# Patient Record
Sex: MALE | Race: BLACK OR AFRICAN AMERICAN | NOT HISPANIC OR LATINO | Employment: STUDENT | URBAN - NONMETROPOLITAN AREA
[De-identification: names, ages, dates, MRNs, and addresses within clinical notes are randomized per-mention and may not be internally consistent; named-entity substitution may affect disease eponyms.]

---

## 2024-01-04 ENCOUNTER — HOSPITAL ENCOUNTER (OUTPATIENT)
Facility: HOSPITAL | Age: 19
Setting detail: OBSERVATION
Discharge: HOME | End: 2024-01-07
Attending: STUDENT IN AN ORGANIZED HEALTH CARE EDUCATION/TRAINING PROGRAM | Admitting: INTERNAL MEDICINE
Payer: COMMERCIAL

## 2024-01-04 ENCOUNTER — APPOINTMENT (OUTPATIENT)
Dept: ULTRASOUND IMAGING | Facility: HOSPITAL | Age: 19
End: 2024-01-04
Payer: COMMERCIAL

## 2024-01-04 ENCOUNTER — APPOINTMENT (OUTPATIENT)
Dept: CT IMAGING | Facility: HOSPITAL | Age: 19
End: 2024-01-04
Payer: COMMERCIAL

## 2024-01-04 ENCOUNTER — APPOINTMENT (OUTPATIENT)
Dept: GENERAL RADIOLOGY | Facility: HOSPITAL | Age: 19
End: 2024-01-04
Payer: COMMERCIAL

## 2024-01-04 DIAGNOSIS — B54 MALARIA: ICD-10-CM

## 2024-01-04 DIAGNOSIS — R65.10 SIRS (SYSTEMIC INFLAMMATORY RESPONSE SYNDROME): Primary | ICD-10-CM

## 2024-01-04 LAB
ALBUMIN SERPL-MCNC: 3.5 G/DL (ref 3.5–5.2)
ALBUMIN/GLOB SERPL: 0.9 G/DL
ALP SERPL-CCNC: 103 U/L (ref 39–117)
ALT SERPL W P-5'-P-CCNC: 25 U/L (ref 1–41)
ANION GAP SERPL CALCULATED.3IONS-SCNC: 7.3 MMOL/L (ref 5–15)
APTT PPP: 32.2 SECONDS (ref 26.5–34.5)
AST SERPL-CCNC: 23 U/L (ref 1–40)
B PARAPERT DNA SPEC QL NAA+PROBE: NOT DETECTED
B PERT DNA SPEC QL NAA+PROBE: NOT DETECTED
BILIRUB CONJ SERPL-MCNC: 0.3 MG/DL (ref 0–0.3)
BILIRUB SERPL-MCNC: 1.8 MG/DL (ref 0–1.2)
BILIRUB UR QL STRIP: NEGATIVE
BUN SERPL-MCNC: 16 MG/DL (ref 6–20)
BUN/CREAT SERPL: 11.8 (ref 7–25)
C PNEUM DNA NPH QL NAA+NON-PROBE: NOT DETECTED
CALCIUM SPEC-SCNC: 9.1 MG/DL (ref 8.6–10.5)
CHLORIDE SERPL-SCNC: 97 MMOL/L (ref 98–107)
CK SERPL-CCNC: 112 U/L (ref 20–200)
CLARITY UR: CLEAR
CO2 SERPL-SCNC: 24.7 MMOL/L (ref 22–29)
COLOR UR: YELLOW
CREAT SERPL-MCNC: 1.36 MG/DL (ref 0.76–1.27)
D-LACTATE SERPL-SCNC: 1.1 MMOL/L (ref 0.5–2)
EGFRCR SERPLBLD CKD-EPI 2021: 76.4 ML/MIN/1.73
FLUAV SUBTYP SPEC NAA+PROBE: NOT DETECTED
FLUBV RNA ISLT QL NAA+PROBE: NOT DETECTED
GLOBULIN UR ELPH-MCNC: 4.1 GM/DL
GLUCOSE SERPL-MCNC: 118 MG/DL (ref 65–99)
GLUCOSE UR STRIP-MCNC: NEGATIVE MG/DL
HADV DNA SPEC NAA+PROBE: NOT DETECTED
HAV IGM SERPL QL IA: NORMAL
HBV CORE IGM SERPL QL IA: NORMAL
HBV SURFACE AG SERPL QL IA: NORMAL
HCOV 229E RNA SPEC QL NAA+PROBE: NOT DETECTED
HCOV HKU1 RNA SPEC QL NAA+PROBE: NOT DETECTED
HCOV NL63 RNA SPEC QL NAA+PROBE: NOT DETECTED
HCOV OC43 RNA SPEC QL NAA+PROBE: NOT DETECTED
HCV AB SER DONR QL: NORMAL
HETEROPH AB SER QL LA: NEGATIVE
HGB UR QL STRIP.AUTO: NEGATIVE
HMPV RNA NPH QL NAA+NON-PROBE: NOT DETECTED
HOLD SPECIMEN: NORMAL
HOLD SPECIMEN: NORMAL
HPIV1 RNA ISLT QL NAA+PROBE: NOT DETECTED
HPIV2 RNA SPEC QL NAA+PROBE: NOT DETECTED
HPIV3 RNA NPH QL NAA+PROBE: NOT DETECTED
HPIV4 P GENE NPH QL NAA+PROBE: NOT DETECTED
INR PPP: 1.13 (ref 0.9–1.1)
KETONES UR QL STRIP: NEGATIVE
LEUKOCYTE ESTERASE UR QL STRIP.AUTO: NEGATIVE
M PNEUMO IGG SER IA-ACNC: NOT DETECTED
NITRITE UR QL STRIP: NEGATIVE
PH UR STRIP.AUTO: 6.5 [PH] (ref 5–8)
POTASSIUM SERPL-SCNC: 3.9 MMOL/L (ref 3.5–5.2)
PROT SERPL-MCNC: 7.6 G/DL (ref 6–8.5)
PROT UR QL STRIP: NEGATIVE
PROTHROMBIN TIME: 15.1 SECONDS (ref 12.1–14.7)
RHINOVIRUS RNA SPEC NAA+PROBE: NOT DETECTED
RSV RNA NPH QL NAA+NON-PROBE: NOT DETECTED
S PYO AG THROAT QL: NEGATIVE
SARS-COV-2 RNA NPH QL NAA+NON-PROBE: NOT DETECTED
SODIUM SERPL-SCNC: 129 MMOL/L (ref 136–145)
SP GR UR STRIP: 1.01 (ref 1–1.03)
UROBILINOGEN UR QL STRIP: NORMAL
WHOLE BLOOD HOLD COAG: NORMAL
WHOLE BLOOD HOLD SPECIMEN: NORMAL

## 2024-01-04 PROCEDURE — 81003 URINALYSIS AUTO W/O SCOPE: CPT | Performed by: PHYSICIAN ASSISTANT

## 2024-01-04 PROCEDURE — G0378 HOSPITAL OBSERVATION PER HR: HCPCS

## 2024-01-04 PROCEDURE — 85060 BLOOD SMEAR INTERPRETATION: CPT | Performed by: STUDENT IN AN ORGANIZED HEALTH CARE EDUCATION/TRAINING PROGRAM

## 2024-01-04 PROCEDURE — 87798 DETECT AGENT NOS DNA AMP: CPT | Performed by: PHYSICIAN ASSISTANT

## 2024-01-04 PROCEDURE — 0202U NFCT DS 22 TRGT SARS-COV-2: CPT | Performed by: PHYSICIAN ASSISTANT

## 2024-01-04 PROCEDURE — 86308 HETEROPHILE ANTIBODY SCREEN: CPT | Performed by: PHYSICIAN ASSISTANT

## 2024-01-04 PROCEDURE — 74177 CT ABD & PELVIS W/CONTRAST: CPT | Performed by: RADIOLOGY

## 2024-01-04 PROCEDURE — 25810000003 SODIUM CHLORIDE 0.9 % SOLUTION: Performed by: PHYSICIAN ASSISTANT

## 2024-01-04 PROCEDURE — 80074 ACUTE HEPATITIS PANEL: CPT | Performed by: PHYSICIAN ASSISTANT

## 2024-01-04 PROCEDURE — 76705 ECHO EXAM OF ABDOMEN: CPT | Performed by: RADIOLOGY

## 2024-01-04 PROCEDURE — 86361 T CELL ABSOLUTE COUNT: CPT | Performed by: PHYSICIAN ASSISTANT

## 2024-01-04 PROCEDURE — 85025 COMPLETE CBC W/AUTO DIFF WBC: CPT | Performed by: STUDENT IN AN ORGANIZED HEALTH CARE EDUCATION/TRAINING PROGRAM

## 2024-01-04 PROCEDURE — 99223 1ST HOSP IP/OBS HIGH 75: CPT | Performed by: INTERNAL MEDICINE

## 2024-01-04 PROCEDURE — 87040 BLOOD CULTURE FOR BACTERIA: CPT | Performed by: STUDENT IN AN ORGANIZED HEALTH CARE EDUCATION/TRAINING PROGRAM

## 2024-01-04 PROCEDURE — 87468 ANAPLSMA PHGCYTOPHLM AMP PRB: CPT | Performed by: PHYSICIAN ASSISTANT

## 2024-01-04 PROCEDURE — 83605 ASSAY OF LACTIC ACID: CPT | Performed by: STUDENT IN AN ORGANIZED HEALTH CARE EDUCATION/TRAINING PROGRAM

## 2024-01-04 PROCEDURE — 80053 COMPREHEN METABOLIC PANEL: CPT | Performed by: STUDENT IN AN ORGANIZED HEALTH CARE EDUCATION/TRAINING PROGRAM

## 2024-01-04 PROCEDURE — 87536 HIV-1 QUANT&REVRSE TRNSCRPJ: CPT | Performed by: PHYSICIAN ASSISTANT

## 2024-01-04 PROCEDURE — 85730 THROMBOPLASTIN TIME PARTIAL: CPT | Performed by: PHYSICIAN ASSISTANT

## 2024-01-04 PROCEDURE — 25510000001 IOPAMIDOL 61 % SOLUTION: Performed by: STUDENT IN AN ORGANIZED HEALTH CARE EDUCATION/TRAINING PROGRAM

## 2024-01-04 PROCEDURE — 25810000003 SODIUM CHLORIDE 0.9 % SOLUTION: Performed by: INTERNAL MEDICINE

## 2024-01-04 PROCEDURE — 96361 HYDRATE IV INFUSION ADD-ON: CPT

## 2024-01-04 PROCEDURE — 71046 X-RAY EXAM CHEST 2 VIEWS: CPT | Performed by: RADIOLOGY

## 2024-01-04 PROCEDURE — 36415 COLL VENOUS BLD VENIPUNCTURE: CPT

## 2024-01-04 PROCEDURE — 71046 X-RAY EXAM CHEST 2 VIEWS: CPT

## 2024-01-04 PROCEDURE — 76705 ECHO EXAM OF ABDOMEN: CPT

## 2024-01-04 PROCEDURE — 74177 CT ABD & PELVIS W/CONTRAST: CPT

## 2024-01-04 PROCEDURE — 87081 CULTURE SCREEN ONLY: CPT | Performed by: PHYSICIAN ASSISTANT

## 2024-01-04 PROCEDURE — 99285 EMERGENCY DEPT VISIT HI MDM: CPT

## 2024-01-04 PROCEDURE — 82248 BILIRUBIN DIRECT: CPT | Performed by: PHYSICIAN ASSISTANT

## 2024-01-04 PROCEDURE — 51798 US URINE CAPACITY MEASURE: CPT

## 2024-01-04 PROCEDURE — 87484 EHRLICHA CHAFFEENSIS AMP PRB: CPT | Performed by: PHYSICIAN ASSISTANT

## 2024-01-04 PROCEDURE — G0432 EIA HIV-1/HIV-2 SCREEN: HCPCS | Performed by: INTERNAL MEDICINE

## 2024-01-04 PROCEDURE — 82550 ASSAY OF CK (CPK): CPT | Performed by: INTERNAL MEDICINE

## 2024-01-04 PROCEDURE — 85610 PROTHROMBIN TIME: CPT | Performed by: PHYSICIAN ASSISTANT

## 2024-01-04 PROCEDURE — 87207 SMEAR SPECIAL STAIN: CPT | Performed by: PHYSICIAN ASSISTANT

## 2024-01-04 PROCEDURE — 87880 STREP A ASSAY W/OPTIC: CPT | Performed by: PHYSICIAN ASSISTANT

## 2024-01-04 RX ORDER — SODIUM CHLORIDE 0.9 % (FLUSH) 0.9 %
10 SYRINGE (ML) INJECTION AS NEEDED
Status: DISCONTINUED | OUTPATIENT
Start: 2024-01-04 | End: 2024-01-07 | Stop reason: HOSPADM

## 2024-01-04 RX ORDER — ACETAMINOPHEN 325 MG/1
650 TABLET ORAL EVERY 4 HOURS PRN
Status: DISCONTINUED | OUTPATIENT
Start: 2024-01-04 | End: 2024-01-07 | Stop reason: HOSPADM

## 2024-01-04 RX ORDER — IBUPROFEN 400 MG/1
400 TABLET ORAL EVERY 6 HOURS PRN
Status: CANCELLED | OUTPATIENT
Start: 2024-01-04

## 2024-01-04 RX ORDER — CALCIUM CARBONATE 500 MG/1
2 TABLET, CHEWABLE ORAL 2 TIMES DAILY PRN
Status: DISCONTINUED | OUTPATIENT
Start: 2024-01-04 | End: 2024-01-07 | Stop reason: HOSPADM

## 2024-01-04 RX ORDER — ATOVAQUONE AND PROGUANIL HYDROCHLORIDE 250; 100 MG/1; MG/1
4 TABLET, FILM COATED ORAL EVERY 24 HOURS
Qty: 12 TABLET | Refills: 0 | Status: COMPLETED | OUTPATIENT
Start: 2024-01-04 | End: 2024-01-06

## 2024-01-04 RX ORDER — SODIUM CHLORIDE 0.9 % (FLUSH) 0.9 %
10 SYRINGE (ML) INJECTION EVERY 12 HOURS SCHEDULED
Status: DISCONTINUED | OUTPATIENT
Start: 2024-01-04 | End: 2024-01-07 | Stop reason: HOSPADM

## 2024-01-04 RX ORDER — SODIUM CHLORIDE 9 MG/ML
40 INJECTION, SOLUTION INTRAVENOUS AS NEEDED
Status: DISCONTINUED | OUTPATIENT
Start: 2024-01-04 | End: 2024-01-07 | Stop reason: HOSPADM

## 2024-01-04 RX ORDER — SODIUM CHLORIDE 9 MG/ML
100 INJECTION, SOLUTION INTRAVENOUS CONTINUOUS
Status: DISCONTINUED | OUTPATIENT
Start: 2024-01-04 | End: 2024-01-05

## 2024-01-04 RX ORDER — ACETAMINOPHEN 500 MG
1000 TABLET ORAL ONCE
Status: DISCONTINUED | OUTPATIENT
Start: 2024-01-04 | End: 2024-01-04 | Stop reason: SDUPTHER

## 2024-01-04 RX ORDER — IBUPROFEN 400 MG/1
400 TABLET ORAL EVERY 6 HOURS PRN
COMMUNITY
End: 2024-01-07 | Stop reason: HOSPADM

## 2024-01-04 RX ORDER — ACETAMINOPHEN 500 MG
1000 TABLET ORAL ONCE
Status: COMPLETED | OUTPATIENT
Start: 2024-01-04 | End: 2024-01-04

## 2024-01-04 RX ADMIN — ATOVAQUONE AND PROGUANIL HYDROCHLORIDE 4 TABLET: 250; 100 TABLET, FILM COATED ORAL at 19:13

## 2024-01-04 RX ADMIN — ACETAMINOPHEN 1000 MG: 500 TABLET ORAL at 09:54

## 2024-01-04 RX ADMIN — IOPAMIDOL 80 ML: 612 INJECTION, SOLUTION INTRAVENOUS at 12:52

## 2024-01-04 RX ADMIN — Medication 10 ML: at 22:48

## 2024-01-04 RX ADMIN — SODIUM CHLORIDE 1000 ML: 9 INJECTION, SOLUTION INTRAVENOUS at 09:55

## 2024-01-04 RX ADMIN — SODIUM CHLORIDE 1000 ML: 9 INJECTION, SOLUTION INTRAVENOUS at 12:58

## 2024-01-04 RX ADMIN — SODIUM CHLORIDE 100 ML/HR: 9 INJECTION, SOLUTION INTRAVENOUS at 22:47

## 2024-01-04 NOTE — ED TRIAGE NOTES
MEDICAL SCREENING:    Reason for Visit: headache, fever, nausea/vomiting    Patient initially seen in triage.  The patient was advised further evaluation and diagnostic testing will be needed, some of the treatment and testing will be initiated in the lobby in order to begin the process.  The patient will be returned to the waiting area for the time being and possibly be re-assessed by a subsequent ED provider.  The patient will be brought back to the treatment area in as timely manner as possible.

## 2024-01-04 NOTE — ED PROVIDER NOTES
Subjective   History of Present Illness  18-year-old male who presents secondary to headache, nausea vomiting and lower abdominal discomfort.  Patient states that he is from [place].  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  He flew in from [facility] on Monday.  He has never been in the [place] before.  He is not up-to-date on any type of vaccinations.  He states that he did bring some smoked fish back with him from [facility] which she ate on Monday and subsequently started feeling poorly.  Patient states that he has had a headache along with nausea vomiting and some diffuse abdominal discomfort.  He denies any burning with urination urinary frequency urgency.  He denies any rash.  He denies any sore throat.  He denies any neck stiffness.  He denies any diarrhea.  He denies any family members in [facility] being sick before he left.  He denies any known exposure to monkeypox, malaria, yellow fever, or any other illness.  He has no history of HIV.  He has no history of hepatitis.  He has no past medical problems.  He presents with his  by private vehicle.        Review of Systems   Constitutional:  Positive for fatigue and fever.   HENT: Negative.     Respiratory: Negative.     Cardiovascular: Negative.  Negative for chest pain.   Gastrointestinal:  Positive for abdominal pain, nausea and vomiting. Negative for diarrhea.   Endocrine: Negative.    Genitourinary: Negative.  Negative for dysuria.   Skin: Negative.    Neurological: Negative.    Psychiatric/Behavioral: Negative.     All other systems reviewed and are negative.      No past medical history on file.    No Known Allergies    No past surgical history on file.    No family history on file.    Social History     Socioeconomic History    Marital status: Single           Objective   Physical Exam  Vitals and nursing note reviewed.   Constitutional:       General: He is not in acute distress.     Appearance: He is  well-developed. He is not diaphoretic.   HENT:      Head: Normocephalic and atraumatic.      Right Ear: External ear normal.      Left Ear: External ear normal.      Nose: Nose normal.   Eyes:      Conjunctiva/sclera: Conjunctivae normal.      Pupils: Pupils are equal, round, and reactive to light.   Neck:      Vascular: No JVD.      Trachea: No tracheal deviation.   Cardiovascular:      Rate and Rhythm: Normal rate and regular rhythm.      Heart sounds: Normal heart sounds. No murmur heard.  Pulmonary:      Effort: Pulmonary effort is normal. No respiratory distress.      Breath sounds: Normal breath sounds. No wheezing.   Abdominal:      General: Bowel sounds are normal.      Palpations: Abdomen is soft.      Tenderness: There is no abdominal tenderness.   Musculoskeletal:         General: No deformity. Normal range of motion.      Cervical back: Normal range of motion and neck supple.   Skin:     General: Skin is warm and dry.      Coloration: Skin is not pale.      Findings: No erythema or rash.   Neurological:      Mental Status: He is alert and oriented to person, place, and time.      Cranial Nerves: No cranial nerve deficit.   Psychiatric:         Behavior: Behavior normal.         Thought Content: Thought content normal.         Procedures           ED Course                                   Results for orders placed or performed during the hospital encounter of 01/04/24   Respiratory Panel PCR w/COVID-19(SARS-CoV-2) HARSHA/SHAYAN/ALEX/PAD/COR/AMANDA In-House, NP Swab in UTM/VTM, 2 HR TAT - Swab, Nasopharynx    Specimen: Nasopharynx; Swab   Result Value Ref Range    ADENOVIRUS, PCR Not Detected Not Detected    Coronavirus 229E Not Detected Not Detected    Coronavirus HKU1 Not Detected Not Detected    Coronavirus NL63 Not Detected Not Detected    Coronavirus OC43 Not Detected Not Detected    COVID19 Not Detected Not Detected - Ref. Range    Human Metapneumovirus Not Detected Not Detected    Human  Rhinovirus/Enterovirus Not Detected Not Detected    Influenza A PCR Not Detected Not Detected    Influenza B PCR Not Detected Not Detected    Parainfluenza Virus 1 Not Detected Not Detected    Parainfluenza Virus 2 Not Detected Not Detected    Parainfluenza Virus 3 Not Detected Not Detected    Parainfluenza Virus 4 Not Detected Not Detected    RSV, PCR Not Detected Not Detected    Bordetella pertussis pcr Not Detected Not Detected    Bordetella parapertussis PCR Not Detected Not Detected    Chlamydophila pneumoniae PCR Not Detected Not Detected    Mycoplasma pneumo by PCR Not Detected Not Detected   Rapid Strep A Screen - Swab, Throat    Specimen: Throat; Swab   Result Value Ref Range    Strep A Ag Negative Negative   Comprehensive Metabolic Panel    Specimen: Arm, Left; Blood   Result Value Ref Range    Glucose 118 (H) 65 - 99 mg/dL    BUN 16 6 - 20 mg/dL    Creatinine 1.36 (H) 0.76 - 1.27 mg/dL    Sodium 129 (L) 136 - 145 mmol/L    Potassium 3.9 3.5 - 5.2 mmol/L    Chloride 97 (L) 98 - 107 mmol/L    CO2 24.7 22.0 - 29.0 mmol/L    Calcium 9.1 8.6 - 10.5 mg/dL    Total Protein 7.6 6.0 - 8.5 g/dL    Albumin 3.5 3.5 - 5.2 g/dL    ALT (SGPT) 25 1 - 41 U/L    AST (SGOT) 23 1 - 40 U/L    Alkaline Phosphatase 103 39 - 117 U/L    Total Bilirubin 1.8 (H) 0.0 - 1.2 mg/dL    Globulin 4.1 gm/dL    A/G Ratio 0.9 g/dL    BUN/Creatinine Ratio 11.8 7.0 - 25.0    Anion Gap 7.3 5.0 - 15.0 mmol/L    eGFR 76.4 >60.0 mL/min/1.73   Lactic Acid, Plasma    Specimen: Arm, Left; Blood   Result Value Ref Range    Lactate 1.1 0.5 - 2.0 mmol/L   CBC Auto Differential    Specimen: Arm, Left; Blood   Result Value Ref Range    WBC 5.75 3.40 - 10.80 10*3/mm3    RBC 4.97 4.14 - 5.80 10*6/mm3    Hemoglobin 13.8 13.0 - 17.7 g/dL    Hematocrit 39.9 37.5 - 51.0 %    MCV 80.3 79.0 - 97.0 fL    MCH 27.8 26.6 - 33.0 pg    MCHC 34.6 31.5 - 35.7 g/dL    RDW 12.9 12.3 - 15.4 %    RDW-SD 37.1 37.0 - 54.0 fl    MPV 10.2 6.0 - 12.0 fL    Platelets 124 (L) 140 -  450 10*3/mm3    Neutrophil % 72.6 42.7 - 76.0 %    Lymphocyte % 11.1 (L) 19.6 - 45.3 %    Monocyte % 15.8 (H) 5.0 - 12.0 %    Eosinophil % 0.0 (L) 0.3 - 6.2 %    Basophil % 0.2 0.0 - 1.5 %    Immature Grans % 0.3 0.0 - 0.5 %    Neutrophils, Absolute 4.17 1.70 - 7.00 10*3/mm3    Lymphocytes, Absolute 0.64 (L) 0.70 - 3.10 10*3/mm3    Monocytes, Absolute 0.91 (H) 0.10 - 0.90 10*3/mm3    Eosinophils, Absolute 0.00 0.00 - 0.40 10*3/mm3    Basophils, Absolute 0.01 0.00 - 0.20 10*3/mm3    Immature Grans, Absolute 0.02 0.00 - 0.05 10*3/mm3    nRBC 0.0 0.0 - 0.2 /100 WBC   Bilirubin, Direct    Specimen: Arm, Left; Blood   Result Value Ref Range    Bilirubin, Direct 0.3 0.0 - 0.3 mg/dL   Urinalysis With Culture If Indicated - Urine, Clean Catch    Specimen: Urine, Clean Catch   Result Value Ref Range    Color, UA Yellow Yellow, Straw    Appearance, UA Clear Clear    pH, UA 6.5 5.0 - 8.0    Specific Gravity, UA 1.009 1.005 - 1.030    Glucose, UA Negative Negative    Ketones, UA Negative Negative    Bilirubin, UA Negative Negative    Blood, UA Negative Negative    Protein, UA Negative Negative    Leuk Esterase, UA Negative Negative    Nitrite, UA Negative Negative    Urobilinogen, UA 1.0 E.U./dL 0.2 - 1.0 E.U./dL   Mononucleosis Screen    Specimen: Arm, Left; Blood   Result Value Ref Range    Monospot Negative Negative   Green Top (Gel)   Result Value Ref Range    Extra Tube Hold for add-ons.    Lavender Top   Result Value Ref Range    Extra Tube hold for add-on    Gold Top - SST   Result Value Ref Range    Extra Tube Hold for add-ons.    Light Blue Top   Result Value Ref Range    Extra Tube Hold for add-ons.      CT Abdomen Pelvis With Contrast    Result Date: 1/4/2024  1.  Mild diffuse fatty infiltration of liver. 2. Marked distention of urinary bladder extending into the lower abdomen with probable associated retention uropathy noted with fullness of the bilateral renal collecting systems. No obstructing stones identified.   This report was finalized on 1/4/2024 1:26 PM by Dr. Eric Winston MD.      US Liver    Result Date: 1/4/2024  1.  No intrahepatic or extrahepatic biliary dilatation identified. 2.  Gallbladder is unremarkable in appearance. 3.  Liver echotexture is unremarkable.   This report was finalized on 1/4/2024 12:11 PM by Dr. Eric Winston MD.      XR Chest 2 View    Result Date: 1/4/2024    Unremarkable radiographic appearance of the chest.   This report was finalized on 1/4/2024 11:05 AM by Dr. Eric Winston MD.               Medical Decision Making  18-year-old male who presents secondary to headache, nausea vomiting and lower abdominal discomfort.  Patient states that he is from [facility].  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  He states that he did bring some patient back from [facility] when he traveled on Monday.  He prepared this on Monday and subsequently developed symptoms.  Patient has had a headache.  He states he feels poorly.  He had a fever.  He has no past medical problems.  He presents by private vehicle.    Amount and/or Complexity of Data Reviewed  Labs: ordered. Decision-making details documented in ED Course.  Radiology: ordered. Decision-making details documented in ED Course.  Discussion of management or test interpretation with external provider(s): Dr Richards, Dr Manrique.  A representative at the CDC.    Risk  OTC drugs.  Prescription drug management.  Decision regarding hospitalization.  Risk Details: Patient was admitted to the hospital for further evaluation and treatment.        Final diagnoses:   SIRS (systemic inflammatory response syndrome)   Malaria       ED Disposition  ED Disposition       ED Disposition   Decision to Admit    Condition   --    Comment   Level of Care: Med/Surg [1]   Diagnosis: SIRS (systemic inflammatory response syndrome) [837508]   Admitting Physician: KASSIE MANRIQUE [[id number]]                 No follow-up provider specified.       Medication List       No changes were made to your prescriptions during this visit.            Jace Su PA  01/04/24 1901

## 2024-01-04 NOTE — H&P
[facility] HOSPITALIST HISTORY AND PHYSICAL      Admit Date: 1/4/2024        Chief complaint Headache, nausea, vomiting abdominal discomfort    Roxy Murphy is a 18 y.o. [place] male without any known significant past medical history who presented to the ED at [facility] via private vehicle with CC of headache, nausea, vomiting and abdominal discomfort. 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 States he brought some smoked fish with him and started feeling poorly after eating on Monday. Reports headache, nausea, vomiting and abdominal discomfort. No reported diarrhea. No reported dysuria. Denies any neck pain or stiffness. Denies any recent sick contacts. Reports receiving vaccines for COVID-19 and yellow fever last year. Denies any significant past medical history with no reported allergies to medications.     In the ED,  he was febrile on presentation with temp of 103. He was mildly tachycardic but hemodynamically stable. Routine labs revealed mild hyponatremia, mildly elevated creatine, elevated total bilirubin, normal lactate, normal WBC and mild thrombocytopenia. Viral hepatitis panel non-reactive. Monospot negative. Rapid strep screen negative. Respiratory PCR negative. UA negative. CXR unremarkable. CT abd/pelvis revealed mild diffuse fatty infiltration of the liver and marked distention of the urinary bladder extending into the lower abdomen with probable associated retention uropathy noted with fullness of the bilateral renal collecting system. US of the liver obtained with no evidence of intrahepatic or extrahepatic biliary dilatation identified with unremarkable GB and liver echotexture. He was able to void with some urinary retention noted. Blood cultures were obtained. ED provider contacted ID physician with additional workup ordered. A peripheral smear was obtained with pathology noting concern  for findings consistent with malaria. CDC representative was contacted by ED provider with noted concern that patient is from an area with possible Chloroquine resistance with recs for Coartem or Malarone. Pharmacy contacted who was able to secure Malarone which is being initiated. Pt is being admitted to med/surg floor for further evaluation and treatment.       History  No past medical history on file.  Past Surgical History:   Procedure Laterality Date    ORIF FEMUR FRACTURE       No family history on file.     (Not in a hospital admission)    Allergies:  Patient has no known allergies.      Review of Systems    Review of Systems   Constitutional:  Positive for activity change, appetite change, fatigue and fever.   HENT:  Negative for nosebleeds and trouble swallowing.    Eyes:  Negative for pain.   Respiratory:  Negative for cough, shortness of breath and wheezing.    Cardiovascular:  Negative for chest pain, palpitations and leg swelling.   Gastrointestinal:  Positive for abdominal pain, nausea and vomiting. Negative for diarrhea.   Genitourinary:  Negative for decreased urine volume, dysuria, frequency and hematuria.   Musculoskeletal:  Negative for back pain, neck pain and neck stiffness.   Skin:  Negative for rash and wound.   Neurological:  Positive for dizziness and headaches. Negative for seizures and syncope.   Psychiatric/Behavioral:  Negative for agitation, confusion and hallucinations.         Objective     Vital Signs  Temp:  [98.1 °F (36.7 °C)-103 °F (39.4 °C)] 98.1 °F (36.7 °C)  Heart Rate:  [] 57  Resp:  [16-18] 16  BP: ()/(33-91) 125/70    Physical Exam:  General:    Awake, alert, in no acute distress   Head:    Normocephalic, atraumatic   Eyes:           PERRLA, EOMI. Conjunctivae and sclerae normal. No icterus or pallor.   Ears:    Ears appear intact with no abnormalities noted.   Throat:   No oral lesions or thrush. Oral mucosa moist   Heart:      Normal S1 and S2. Regular rate and  rhythm. No significant murmur, rubs or gallops appreciated.   Lungs:     Respirations regular, even and unlabored. Lungs clear to auscultation B/L. No wheezes, rales or rhonchi.   Abdomen:   Soft and nontender. No guarding, rebound tenderness or  organomegaly noted. Bowel sounds present x 4.   MS:   Muscular strength intact and equal B/L. No joint swelling, deformity, or tenderness.   Extremities:  No clubbing, cyanosis or edema noted. Moves UE and LE equally B/L.   Pulses:   Pulses palpable and equal bilaterally.   Skin:   No bleeding, bruising or rash. (+) surgical scar right anterior shin.    Lymph nodes:   No palpable adenopathy   Neurologic:   Cranial nerves 2 - 12 grossly intact. Sensation intact.        Results Review:     I reviewed the patient's new imaging results and agree with the interpretation.  I reviewed the patient's other test results and agree with the interpretation.    Results from last 7 days   Lab Units 01/04/24  1003   WBC 10*3/mm3 5.75   HEMOGLOBIN g/dL 13.8   PLATELETS 10*3/mm3 124*     Results from last 7 days   Lab Units 01/04/24  1003   SODIUM mmol/L 129*   POTASSIUM mmol/L 3.9   CHLORIDE mmol/L 97*   CO2 mmol/L 24.7   BUN mg/dL 16   CREATININE mg/dL 1.36*   CALCIUM mg/dL 9.1   GLUCOSE mg/dL 118*     Results from last 7 days   Lab Units 01/04/24  1003   BILIRUBIN mg/dL 1.8*   ALK PHOS U/L 103   AST (SGOT) U/L 23   ALT (SGPT) U/L 25         Results from last 7 days   Lab Units 01/04/24  1920   INR  1.13*           Imaging Results (Last 24 Hours)       Procedure Component Value Units Date/Time    CT Abdomen Pelvis With Contrast [[id number]] Collected: 01/04/24 1324     Updated: 01/04/24 1335    Narrative:      EXAM:    CT Abdomen and Pelvis With Intravenous Contrast     EXAM DATE:    1/4/2024 12:27 PM     CLINICAL HISTORY:    Abdominal pain.     TECHNIQUE:    Axial computed tomography images of the abdomen and pelvis with  intravenous contrast.  Sagittal and coronal reformatted images  were  created and reviewed.  This CT exam was performed using one or more of  the following dose reduction techniques:  automated exposure control,  adjustment of the mA and/or kV according to patient size, and/or use of  iterative reconstruction technique.     COMPARISON:    No relevant prior studies available.     FINDINGS:    Lung bases:  Lung bases are clear.      ABDOMEN:    Liver:  Mild diffuse fatty infiltration of liver.    Gallbladder and bile ducts:  Unremarkable as visualized.  No calcified  stones.  No ductal dilation.    Pancreas:  Unremarkable as visualized.  No mass.  No ductal dilation.    Spleen:  Unremarkable as visualized.  No splenomegaly.    Adrenals:  Unremarkable as visualized.  No mass.    Kidneys and ureters:  Fullness of the renal collecting systems without  obstructing stones identified. Favor retention uropathy given marked  distention of the urinary bladder which extends into the lower abdomen.    Stomach and bowel:  Unremarkable as visualized.  No obstruction.  No  mucosal thickening.      PELVIS:    Appendix:  The appendix appears within normal limits.    Bladder:  Unremarkable as visualized.  No mass.    Reproductive:  Unremarkable as visualized.      ABDOMEN and PELVIS:    Intraperitoneal space:  There is no evidence of pneumoperitoneum.  No  significant fluid collection.    Bones/joints:  No dislocation.  No acute bony findings identified.    Soft tissues:  Unremarkable as visualized.    Vasculature:  Unremarkable as visualized.  No abdominal aortic  aneurysm.    Lymph nodes:  Unremarkable as visualized.  No enlarged lymph nodes.       Impression:      1.  Mild diffuse fatty infiltration of liver.  2. Marked distention of urinary bladder extending into the lower abdomen  with probable associated retention uropathy noted with fullness of the  bilateral renal collecting systems. No obstructing stones identified.     This report was finalized on 1/4/2024 1:26 PM by Dr. Eric Winston,  MD.       US Liver [[id number]] Collected: 01/04/24 1210     Updated: 01/04/24 1219    Narrative:      EXAM:    US Abdomen Limited, Right Upper Quadrant     EXAM DATE:    1/4/2024 11:42 AM     CLINICAL HISTORY:    elevated Bili     TECHNIQUE:    Real-time ultrasound of the right upper quadrant with image  documentation.     COMPARISON:    No relevant prior studies available.     FINDINGS:    Liver:  Liver echotexture is unremarkable.  No intrahepatic or  extrahepatic biliary dilatation identified.    Gallbladder:  Gallbladder is unremarkable in appearance.  No  gallstones.    Common bile duct:  See above.    Pancreas:  Unremarkable as visualized.       Impression:      1.  No intrahepatic or extrahepatic biliary dilatation identified.  2.  Gallbladder is unremarkable in appearance.  3.  Liver echotexture is unremarkable.        This report was finalized on 1/4/2024 12:11 PM by Dr. Eric Winston MD.       XR Chest 2 View [[id number]] Collected: 01/04/24 1105     Updated: 01/04/24 1111    Narrative:      EXAM:    XR Chest, 2 Views     EXAM DATE:    1/4/2024 10:11 AM     CLINICAL HISTORY:    fever, cough     TECHNIQUE:    Frontal and lateral views of the chest.     COMPARISON:    No relevant prior studies available.     FINDINGS:    Lungs and pleural spaces:  Unremarkable as visualized.  No  consolidation.  No pneumothorax.    Heart:  Unremarkable as visualized.  No cardiomegaly.    Mediastinum:  Unremarkable as visualized.    Bones/joints:  Unremarkable as visualized.       Impression:        Unremarkable radiographic appearance of the chest.        This report was finalized on 1/4/2024 11:05 AM by Dr. Eric Winston MD.                   Assessment & Plan   Sepsis (present on admission): Likely 2/2 suspected malaria based on initial interpretation of peripheral smear. CDC contacted with concern that patient is from Chloroquine resistant area. Malarone initiated in the ED and will continue on admission. Additional  studies ordered per CDC and ID recs. Monitor renal function, liver enzymes, coagulation studies and monitor for acidosis. Repeat peripheral smear in AM. ID consulted. Cont supportive treatment.     Suspected acute renal insufficiency: Unclear of baseline with no previous labs available. CT abd/pelvis marked distention of the urinary bladder extending into the lower abdomen with probable associated retention uropathy noted with fullness of the bilateral renal collecting system. Checked CPK which is normal. Order serial bladder scans. Monitor I/O's. Cont maintenance IVF's. Repeat labs in the AM.     Mild thrombocytopenia: Likely 2/2 above. Treatment as outlined above. Repeat peripheral smear in the AM. Repeat CBC in the AM.     Hyperbilirubinemia: Likely 2/2 above. US of the liver obtained with no evidence of intrahepatic or extrahepatic biliary dilatation identified with unremarkable GB and liver echotexture. Viral hepatitis panel is non-reactive. HIV ordered. Repeat CMP in the AM. Repeat PT/INR in the AM.     DVT PPX: SCD's     I discussed the patient's findings and my recommendations with patient and ED provider .       Jarvis Mattson DO  01/04/24  20:10 EST

## 2024-01-05 LAB
ALBUMIN SERPL-MCNC: 3.1 G/DL (ref 3.5–5.2)
ALBUMIN/GLOB SERPL: 1 G/DL
ALP SERPL-CCNC: 84 U/L (ref 39–117)
ALT SERPL W P-5'-P-CCNC: 17 U/L (ref 1–41)
ANION GAP SERPL CALCULATED.3IONS-SCNC: 7 MMOL/L (ref 5–15)
APTT PPP: 32 SECONDS (ref 26.5–34.5)
AST SERPL-CCNC: 15 U/L (ref 1–40)
BASOPHILS # BLD AUTO: 0.01 10*3/MM3 (ref 0–0.2)
BASOPHILS # BLD AUTO: 0.02 10*3/MM3 (ref 0–0.2)
BASOPHILS NFR BLD AUTO: 0.2 % (ref 0–1.5)
BASOPHILS NFR BLD AUTO: 0.5 % (ref 0–1.5)
BILIRUB SERPL-MCNC: 1.1 MG/DL (ref 0–1.2)
BUN SERPL-MCNC: 12 MG/DL (ref 6–20)
BUN/CREAT SERPL: 12.2 (ref 7–25)
CALCIUM SPEC-SCNC: 8.4 MG/DL (ref 8.6–10.5)
CHLORIDE SERPL-SCNC: 102 MMOL/L (ref 98–107)
CO2 SERPL-SCNC: 25 MMOL/L (ref 22–29)
CREAT SERPL-MCNC: 0.98 MG/DL (ref 0.76–1.27)
CYTOLOGIST CVX/VAG CYTO: NORMAL
DEPRECATED RDW RBC AUTO: 37.1 FL (ref 37–54)
DEPRECATED RDW RBC AUTO: 39.4 FL (ref 37–54)
EGFRCR SERPLBLD CKD-EPI 2021: 113.2 ML/MIN/1.73
EOSINOPHIL # BLD AUTO: 0 10*3/MM3 (ref 0–0.4)
EOSINOPHIL # BLD AUTO: 0.05 10*3/MM3 (ref 0–0.4)
EOSINOPHIL NFR BLD AUTO: 0 % (ref 0.3–6.2)
EOSINOPHIL NFR BLD AUTO: 1.3 % (ref 0.3–6.2)
ERYTHROCYTE [DISTWIDTH] IN BLOOD BY AUTOMATED COUNT: 12.9 % (ref 12.3–15.4)
ERYTHROCYTE [DISTWIDTH] IN BLOOD BY AUTOMATED COUNT: 13.1 % (ref 12.3–15.4)
GLOBULIN UR ELPH-MCNC: 3 GM/DL
GLUCOSE SERPL-MCNC: 136 MG/DL (ref 65–99)
HCT VFR BLD AUTO: 35.3 % (ref 37.5–51)
HCT VFR BLD AUTO: 39.9 % (ref 37.5–51)
HGB BLD-MCNC: 11.8 G/DL (ref 13–17.7)
HGB BLD-MCNC: 13.8 G/DL (ref 13–17.7)
HIV 1+2 AB+HIV1 P24 AG SERPL QL IA: NORMAL
IMM GRANULOCYTES # BLD AUTO: 0.02 10*3/MM3 (ref 0–0.05)
IMM GRANULOCYTES # BLD AUTO: 0.03 10*3/MM3 (ref 0–0.05)
IMM GRANULOCYTES NFR BLD AUTO: 0.3 % (ref 0–0.5)
IMM GRANULOCYTES NFR BLD AUTO: 0.8 % (ref 0–0.5)
INR PPP: 1.12 (ref 0.9–1.1)
LYMPHOCYTES # BLD AUTO: 0.64 10*3/MM3 (ref 0.7–3.1)
LYMPHOCYTES # BLD AUTO: 1.46 10*3/MM3 (ref 0.7–3.1)
LYMPHOCYTES NFR BLD AUTO: 11.1 % (ref 19.6–45.3)
LYMPHOCYTES NFR BLD AUTO: 39.4 % (ref 19.6–45.3)
MAGNESIUM SERPL-MCNC: 2 MG/DL (ref 1.7–2.2)
MCH RBC QN AUTO: 27.6 PG (ref 26.6–33)
MCH RBC QN AUTO: 27.8 PG (ref 26.6–33)
MCHC RBC AUTO-ENTMCNC: 33.4 G/DL (ref 31.5–35.7)
MCHC RBC AUTO-ENTMCNC: 34.6 G/DL (ref 31.5–35.7)
MCV RBC AUTO: 80.3 FL (ref 79–97)
MCV RBC AUTO: 82.7 FL (ref 79–97)
MONOCYTES # BLD AUTO: 0.69 10*3/MM3 (ref 0.1–0.9)
MONOCYTES # BLD AUTO: 0.91 10*3/MM3 (ref 0.1–0.9)
MONOCYTES NFR BLD AUTO: 15.8 % (ref 5–12)
MONOCYTES NFR BLD AUTO: 18.6 % (ref 5–12)
NEUTROPHILS NFR BLD AUTO: 1.46 10*3/MM3 (ref 1.7–7)
NEUTROPHILS NFR BLD AUTO: 39.4 % (ref 42.7–76)
NEUTROPHILS NFR BLD AUTO: 4.17 10*3/MM3 (ref 1.7–7)
NEUTROPHILS NFR BLD AUTO: 72.6 % (ref 42.7–76)
NRBC BLD AUTO-RTO: 0 /100 WBC (ref 0–0.2)
NRBC BLD AUTO-RTO: 0 /100 WBC (ref 0–0.2)
PATH INTERP BLD-IMP: NORMAL
PLATELET # BLD AUTO: 116 10*3/MM3 (ref 140–450)
PLATELET # BLD AUTO: 124 10*3/MM3 (ref 140–450)
PMV BLD AUTO: 10.2 FL (ref 6–12)
PMV BLD AUTO: 9.8 FL (ref 6–12)
POTASSIUM SERPL-SCNC: 3.2 MMOL/L (ref 3.5–5.2)
POTASSIUM SERPL-SCNC: 3.9 MMOL/L (ref 3.5–5.2)
PROT SERPL-MCNC: 6.1 G/DL (ref 6–8.5)
PROTHROMBIN TIME: 15 SECONDS (ref 12.1–14.7)
RBC # BLD AUTO: 4.27 10*6/MM3 (ref 4.14–5.8)
RBC # BLD AUTO: 4.97 10*6/MM3 (ref 4.14–5.8)
SODIUM SERPL-SCNC: 134 MMOL/L (ref 136–145)
WBC NRBC COR # BLD AUTO: 3.71 10*3/MM3 (ref 3.4–10.8)
WBC NRBC COR # BLD AUTO: 5.75 10*3/MM3 (ref 3.4–10.8)

## 2024-01-05 PROCEDURE — 96361 HYDRATE IV INFUSION ADD-ON: CPT

## 2024-01-05 PROCEDURE — 80053 COMPREHEN METABOLIC PANEL: CPT | Performed by: INTERNAL MEDICINE

## 2024-01-05 PROCEDURE — 99204 OFFICE O/P NEW MOD 45 MIN: CPT | Performed by: INTERNAL MEDICINE

## 2024-01-05 PROCEDURE — 85610 PROTHROMBIN TIME: CPT | Performed by: INTERNAL MEDICINE

## 2024-01-05 PROCEDURE — G0378 HOSPITAL OBSERVATION PER HR: HCPCS

## 2024-01-05 PROCEDURE — 84132 ASSAY OF SERUM POTASSIUM: CPT | Performed by: INTERNAL MEDICINE

## 2024-01-05 PROCEDURE — 99232 SBSQ HOSP IP/OBS MODERATE 35: CPT | Performed by: INTERNAL MEDICINE

## 2024-01-05 PROCEDURE — 25810000003 SODIUM CHLORIDE 0.9 % SOLUTION: Performed by: INTERNAL MEDICINE

## 2024-01-05 PROCEDURE — 51798 US URINE CAPACITY MEASURE: CPT

## 2024-01-05 PROCEDURE — 85730 THROMBOPLASTIN TIME PARTIAL: CPT | Performed by: INTERNAL MEDICINE

## 2024-01-05 PROCEDURE — 93005 ELECTROCARDIOGRAM TRACING: CPT

## 2024-01-05 PROCEDURE — 85025 COMPLETE CBC W/AUTO DIFF WBC: CPT | Performed by: INTERNAL MEDICINE

## 2024-01-05 PROCEDURE — 83735 ASSAY OF MAGNESIUM: CPT | Performed by: INTERNAL MEDICINE

## 2024-01-05 RX ORDER — BISACODYL 10 MG
10 SUPPOSITORY, RECTAL RECTAL DAILY PRN
Status: DISCONTINUED | OUTPATIENT
Start: 2024-01-05 | End: 2024-01-07 | Stop reason: HOSPADM

## 2024-01-05 RX ORDER — ONDANSETRON 4 MG/1
4 TABLET, ORALLY DISINTEGRATING ORAL EVERY 6 HOURS PRN
Status: DISCONTINUED | OUTPATIENT
Start: 2024-01-05 | End: 2024-01-07 | Stop reason: HOSPADM

## 2024-01-05 RX ORDER — POTASSIUM CHLORIDE 20 MEQ/1
40 TABLET, EXTENDED RELEASE ORAL EVERY 4 HOURS
Status: COMPLETED | OUTPATIENT
Start: 2024-01-05 | End: 2024-01-05

## 2024-01-05 RX ORDER — AMOXICILLIN 250 MG
2 CAPSULE ORAL 2 TIMES DAILY
Status: DISCONTINUED | OUTPATIENT
Start: 2024-01-06 | End: 2024-01-07 | Stop reason: HOSPADM

## 2024-01-05 RX ORDER — BISACODYL 5 MG/1
5 TABLET, DELAYED RELEASE ORAL DAILY PRN
Status: DISCONTINUED | OUTPATIENT
Start: 2024-01-05 | End: 2024-01-07 | Stop reason: HOSPADM

## 2024-01-05 RX ORDER — POLYETHYLENE GLYCOL 3350 17 G/17G
17 POWDER, FOR SOLUTION ORAL DAILY PRN
Status: DISCONTINUED | OUTPATIENT
Start: 2024-01-05 | End: 2024-01-07 | Stop reason: HOSPADM

## 2024-01-05 RX ADMIN — ATOVAQUONE AND PROGUANIL HYDROCHLORIDE 4 TABLET: 250; 100 TABLET, FILM COATED ORAL at 19:50

## 2024-01-05 RX ADMIN — Medication 10 ML: at 08:05

## 2024-01-05 RX ADMIN — POTASSIUM CHLORIDE 40 MEQ: 1500 TABLET, EXTENDED RELEASE ORAL at 10:16

## 2024-01-05 RX ADMIN — POTASSIUM CHLORIDE 40 MEQ: 1500 TABLET, EXTENDED RELEASE ORAL at 13:15

## 2024-01-05 RX ADMIN — ACETAMINOPHEN 650 MG: 325 TABLET ORAL at 19:50

## 2024-01-05 RX ADMIN — SODIUM CHLORIDE 100 ML/HR: 9 INJECTION, SOLUTION INTRAVENOUS at 08:05

## 2024-01-05 RX ADMIN — ACETAMINOPHEN 650 MG: 325 TABLET ORAL at 13:14

## 2024-01-05 NOTE — PLAN OF CARE
Goal Outcome Evaluation:  Plan of Care Reviewed With: patient           Outcome Evaluation: Pt admitted to floor from ER this shift. A&O x 4 on RA. No new concerns or complaints, VSS and will continue with POC.

## 2024-01-05 NOTE — CONSULTS
INFECTIOUS DISEASE CONSULTATION REPORT        Patient Identification:  Name:  Jael Murphy  Age:  18 y.o.  Sex:  male  :  2005  MRN:  [id number]   Visit Number:  [id number]  Primary Care Physician:  Provider, No Known        Referring Provider:  Dr. Mattson    Reason for consult: Malaria       LOS: 0 days        Subjective       Subjective     History of present illness:      Thank you Dr. Mattson for allowing us to participate in the care of your patient.  As you well know, Mr. Jael Murphy is a 18 y.o. male with no significant past medical history, who presented to [facility] Emergency Department on 2024 for headache, nausea, vomiting, abdominal discomfort.  Patient arrived approximately 3 days ago to the [place] from [facility] to attend local [place].  Patient reportedly brought Smartfish with him and started feeling poorly after eating on Monday.  Blood cultures from 2024 currently in process.  Respiratory panel PCR negative.  Strep a screen negative.  Beta strep throat culture negative.  WBC normal at 3.71.  Lactic acid normal at 1.1.  Hepatitis panel nonreactive.  Monospot negative.    Case was discussed with ED provider and peripheral smear and thick smear were ordered.  Rickettsia serologies, CD4 count and RNA PCR pending as well.    Today patient sitting up in bed.  Currently on room air with no apparent distress.  Continues with mild headache but overall feels much better today.  Fever and chills resolved overnight.  Lungs clear to auscultation bilaterally.  Abdomen soft, nontender.  Denies diarrhea.      Infectious Disease consultation was requested for antimicrobial management.      ---------------------------------------------------------------------------------------------------------------------     Review Of Systems:    Constitutional: no fever, chills and night sweats. No appetite change or unexpected weight change. No fatigue.  Eyes: no eye drainage,  itching or redness.  HEENT: no mouth sores, dysphagia or nose bleed.  Respiratory: no for shortness of breath, cough or production of sputum.  Cardiovascular: no chest pain, no palpitations, no orthopnea.  Gastrointestinal: no nausea, vomiting or diarrhea. No abdominal pain, hematemesis or rectal bleeding.  Genitourinary: no dysuria or polyuria.  Hematologic/lymphatic: no lymph node abnormalities, no easy bruising or easy bleeding.  Musculoskeletal: no muscle or joint pain.  Skin: No rash and no itching.  Neurological: no loss of consciousness, no seizure, Positive mild headache.  Behavioral/Psych: no depression or suicidal ideation.  Endocrine: no hot flashes.  Immunologic: negative.    ---------------------------------------------------------------------------------------------------------------------     Past Medical History    History reviewed. No pertinent past medical history.    Past Surgical History    Past Surgical History:   Procedure Laterality Date    ORIF FEMUR FRACTURE         Family History    History reviewed. No pertinent family history.    Social History    Social History     Tobacco Use    Smoking status: Never    Smokeless tobacco: Never   Vaping Use    Vaping Use: Never used   Substance Use Topics    Alcohol use: Never    Drug use: Never       Allergies    Patient has no known allergies.  ---------------------------------------------------------------------------------------------------------------------     Home Medications:    Prior to Admission Medications       Prescriptions Last Dose Informant Patient Reported? Taking?    ibuprofen (ADVIL,MOTRIN) 400 MG tablet Unknown Self Yes No    Take 1 tablet by mouth Every 6 (Six) Hours As Needed for Mild Pain.          ---------------------------------------------------------------------------------------------------------------------    Objective       Objective     Hospital Scheduled Meds:  atovaquone-proguanil, 4 tablet, Oral, Q24H  potassium  chloride ER, 40 mEq, Oral, Q4H  sodium chloride, 10 mL, Intravenous, Q12H      sodium chloride, 100 mL/hr, Last Rate: 100 mL/hr (01/05/24 0805)      ---------------------------------------------------------------------------------------------------------------------   Vital Signs:  Temp:  [97.6 °F (36.4 °C)-99 °F (37.2 °C)] 97.6 °F (36.4 °C)  Heart Rate:  [53-88] 54  Resp:  [16-18] 16  BP: ()/(33-91) 120/65  Mean Arterial Pressure (Non-Invasive) for the past 24 hrs (Last 3 readings):   Noninvasive MAP (mmHg)   01/04/24 1830 87   01/04/24 1815 86   01/04/24 1800 78     SpO2 Percentage    01/04/24 1815 01/04/24 1830 01/04/24 2053   SpO2: 99% 98% 98%     SpO2:  [97 %-100 %] 98 %  on   ;   Device (Oxygen Therapy): room air    Body mass index is 25.57 kg/m².  Wt Readings from Last 3 Encounters:   01/04/24 87.9 kg (193 lb 12.8 oz)     ---------------------------------------------------------------------------------------------------------------------     Physical Exam:    Constitutional:  Well-developed and well-nourished.  No respiratory distress. On room air.      HENT:  Head: Normocephalic and atraumatic.  Mouth:  Moist mucous membranes.    Eyes:  Conjunctivae and EOM are normal.  No scleral icterus.  Neck:  Neck supple.  No JVD present.    Cardiovascular:  Normal rate, regular rhythm and normal heart sounds with no murmur. No edema.  Pulmonary/Chest:  No respiratory distress, no wheezes, no crackles, with normal breath sounds and good air movement.  Abdominal:  Soft.  Bowel sounds are normal.  No distension and no tenderness.   Musculoskeletal:  No edema, no tenderness, and no deformity.  No swelling or redness of joints.  Neurological:  Alert and oriented to person, place, and time.  No facial droop.  No slurred speech.   Skin:  Skin is warm and dry.  No rash noted.  No pallor.   Psychiatric:  Normal mood and affect.  Behavior is  "normal.    ---------------------------------------------------------------------------------------------------------------------    Results from last 7 days   Lab Units 01/04/24  1003   CK TOTAL U/L 112           Results from last 7 days   Lab Units 01/05/24  0338 01/04/24  1920 01/04/24  1003   LACTATE mmol/L  --   --  1.1   WBC 10*3/mm3 3.71  --  5.75   HEMOGLOBIN g/dL 11.8*  --  13.8   HEMATOCRIT % 35.3*  --  39.9   MCV fL 82.7  --  80.3   MCHC g/dL 33.4  --  34.6   PLATELETS 10*3/mm3 116*  --  124*   INR  1.12* 1.13*  --      Results from last 7 days   Lab Units 01/05/24  0338 01/04/24  1003   SODIUM mmol/L 134* 129*   POTASSIUM mmol/L 3.2* 3.9   MAGNESIUM mg/dL 2.0  --    CHLORIDE mmol/L 102 97*   CO2 mmol/L 25.0 24.7   BUN mg/dL 12 16   CREATININE mg/dL 0.98 1.36*   CALCIUM mg/dL 8.4* 9.1   GLUCOSE mg/dL 136* 118*   ALBUMIN g/dL 3.1* 3.5   BILIRUBIN mg/dL 1.1 1.8*   ALK PHOS U/L 84 103   AST (SGOT) U/L 15 23   ALT (SGPT) U/L 17 25   Estimated Creatinine Clearance: 149.5 mL/min (by C-G formula based on SCr of 0.98 mg/dL).  No results found for: \"AMMONIA\"    No results found for: \"HGBA1C\", \"POCGLU\"  No results found for: \"HGBA1C\"  No results found for: \"TSH\", \"FREET4\"    No results found for: \"BLOODCX\"  No results found for: \"URINECX\"  No results found for: \"WOUNDCX\"  No results found for: \"STOOLCX\"  No results found for: \"RESPCX\"  Pain Management Panel           No data to display              I have personally reviewed the above laboratory results.   ---------------------------------------------------------------------------------------------------------------------  Imaging Results (Last 7 Days)       Procedure Component Value Units Date/Time    CT Abdomen Pelvis With Contrast [[id number]] Collected: 01/04/24 1324     Updated: 01/04/24 1335    Narrative:      EXAM:    CT Abdomen and Pelvis With Intravenous Contrast     EXAM DATE:    1/4/2024 12:27 PM     CLINICAL HISTORY:    Abdominal pain.     TECHNIQUE:    " Axial computed tomography images of the abdomen and pelvis with  intravenous contrast.  Sagittal and coronal reformatted images were  created and reviewed.  This CT exam was performed using one or more of  the following dose reduction techniques:  automated exposure control,  adjustment of the mA and/or kV according to patient size, and/or use of  iterative reconstruction technique.     COMPARISON:    No relevant prior studies available.     FINDINGS:    Lung bases:  Lung bases are clear.      ABDOMEN:    Liver:  Mild diffuse fatty infiltration of liver.    Gallbladder and bile ducts:  Unremarkable as visualized.  No calcified  stones.  No ductal dilation.    Pancreas:  Unremarkable as visualized.  No mass.  No ductal dilation.    Spleen:  Unremarkable as visualized.  No splenomegaly.    Adrenals:  Unremarkable as visualized.  No mass.    Kidneys and ureters:  Fullness of the renal collecting systems without  obstructing stones identified. Favor retention uropathy given marked  distention of the urinary bladder which extends into the lower abdomen.    Stomach and bowel:  Unremarkable as visualized.  No obstruction.  No  mucosal thickening.      PELVIS:    Appendix:  The appendix appears within normal limits.    Bladder:  Unremarkable as visualized.  No mass.    Reproductive:  Unremarkable as visualized.      ABDOMEN and PELVIS:    Intraperitoneal space:  There is no evidence of pneumoperitoneum.  No  significant fluid collection.    Bones/joints:  No dislocation.  No acute bony findings identified.    Soft tissues:  Unremarkable as visualized.    Vasculature:  Unremarkable as visualized.  No abdominal aortic  aneurysm.    Lymph nodes:  Unremarkable as visualized.  No enlarged lymph nodes.       Impression:      1.  Mild diffuse fatty infiltration of liver.  2. Marked distention of urinary bladder extending into the lower abdomen  with probable associated retention uropathy noted with fullness of the  bilateral renal  collecting systems. No obstructing stones identified.     This report was finalized on 1/4/2024 1:26 PM by Dr. Eric Winston MD.       US Liver [[id number]] Collected: 01/04/24 1210     Updated: 01/04/24 1219    Narrative:      EXAM:    US Abdomen Limited, Right Upper Quadrant     EXAM DATE:    1/4/2024 11:42 AM     CLINICAL HISTORY:    elevated Bili     TECHNIQUE:    Real-time ultrasound of the right upper quadrant with image  documentation.     COMPARISON:    No relevant prior studies available.     FINDINGS:    Liver:  Liver echotexture is unremarkable.  No intrahepatic or  extrahepatic biliary dilatation identified.    Gallbladder:  Gallbladder is unremarkable in appearance.  No  gallstones.    Common bile duct:  See above.    Pancreas:  Unremarkable as visualized.       Impression:      1.  No intrahepatic or extrahepatic biliary dilatation identified.  2.  Gallbladder is unremarkable in appearance.  3.  Liver echotexture is unremarkable.        This report was finalized on 1/4/2024 12:11 PM by Dr. Eric Winston MD.       XR Chest 2 View [[id number]] Collected: 01/04/24 1105     Updated: 01/04/24 1111    Narrative:      EXAM:    XR Chest, 2 Views     EXAM DATE:    1/4/2024 10:11 AM     CLINICAL HISTORY:    fever, cough     TECHNIQUE:    Frontal and lateral views of the chest.     COMPARISON:    No relevant prior studies available.     FINDINGS:    Lungs and pleural spaces:  Unremarkable as visualized.  No  consolidation.  No pneumothorax.    Heart:  Unremarkable as visualized.  No cardiomegaly.    Mediastinum:  Unremarkable as visualized.    Bones/joints:  Unremarkable as visualized.       Impression:        Unremarkable radiographic appearance of the chest.        This report was finalized on 1/4/2024 11:05 AM by Dr. Eric Winston MD.             I have personally reviewed the above radiology results.    ---------------------------------------------------------------------------------------------------------------------      Pertinent Infectious Disease Results      Assessment & Plan      Assessment        Malaria      Plan      I saw and examined the patient myself this morning with DIA Ribeiro and discussed the findings and plan of care with her and got report from primary RN and here are my findings:    The patient presented at [facility] Emergency Department on 1/4/2024, complaining of headache, nausea, vomiting, and abdominal discomfort. The patient arrived in the [place] from [place] approximately three days ago to attend a local university. Symptoms started after consuming Smartfish on Monday. Blood cultures from 1/4/2024 are currently being processed. Initial test results, including a negative respiratory panel PCR, negative Strep A screen, negative beta strep throat culture, normal WBC count at 3.71, and normal lactic acid at 1.1, have been observed. The hepatitis panel and Monospot test both yielded nonreactive results.    The case was discussed with the ED provider, and a peripheral smear and thick smear were ordered, along with pending tests for Rickettsia serologies, CD4 count, and RNA PCR.    Today, the patient is seated in bed, experiencing a mild headache but feeling notably better overall. Fever and chills have resolved overnight. Lung examination shows clear auscultation bilaterally, and the abdomen remains soft and non-tender. The patient denies experiencing diarrhea.    The recommendation is for the patient to continue the 3-day course of atovaquone-proguanil for malaria treatment. There is observed improvement from an infectious disease standpoint, and discharge is considered acceptable from an infectious disease perspective with close monitoring of CBC. No isolation is required based on infectious disease considerations.        ANTIMICROBIAL THERAPY    This patient  does not have an active medication from one of the medication groupers.       Again, thank you Dr. Mattson for allowing us to participate in the care of your patient and please feel free to call for any questions you may have.        Code Status:     Code Status and Medical Interventions:   Ordered at: 01/04/24 1706     Code Status (Patient has no pulse and is not breathing):    CPR (Attempt to Resuscitate)     Medical Interventions (Patient has pulse or is breathing):    Full Support         DIA Ribeiro  01/05/24  09:47 EST    Electronically signed by DIA Ribeiro, 01/05/24, 9:55 AM EST.    Electronically signed by Angelina Richards MD, 01/05/24, 10:13 AM EST.

## 2024-01-05 NOTE — CASE MANAGEMENT/SOCIAL WORK
Discharge Planning Assessment   Jose     Patient Name: Jael Murphy  MRN: [id number]  Today's Date: 1/5/2024    Admit Date: 1/4/2024    Plan: FADIA spoke with pt at the bedside. Pt lives in dorm at [facility] and lists his  Loi Canela [phone] as contact. Address listed on facesheet is Loi address since he does not know pt's mailbox number on campus. CM contacted Loi and he is agreeable to be listed as pt's emergency contact and he will transport back to campus when d/c. CM added to chart. Pt is independent with his care and denies any DME/HH or needs at this time. Pt does not have PCP. He is enrolled with meds to beds for d/c prescriptions. CM will follow.   Discharge Needs Assessment       Row Name 01/05/24 0907       Living Environment    People in Home alone    Unique Family Situation Pt lives in dorm at [facility]    Potentially Unsafe Housing Conditions none    Primary Care Provided by self    Provides Primary Care For no one    Family Caregiver if Needed none    Quality of Family Relationships unable to assess    Able to Return to Prior Arrangements yes       Resource/Environmental Concerns    Resource/Environmental Concerns none    Transportation Concerns none       Transition Planning    Patient/Family Anticipates Transition to home    Patient/Family Anticipated Services at Transition none    Transportation Anticipated family or friend will provide       Discharge Needs Assessment    Equipment Currently Used at Home none    Concerns to be Addressed denies needs/concerns at this time;no discharge needs identified    Anticipated Changes Related to Illness none    Equipment Needed After Discharge none    Patient's Choice of Community Agency(s) Pt does not have HH and denies any needs.                   Discharge Plan       Row Name 01/05/24 0909       Plan    Plan FADIA spoke with pt at the bedside. Pt lives in dorm at [facility] and  lists his  Loi Canela [phone] as contact. Address listed on facesheet is Loi address since he does not know pt's mailbox number on campus. CM contacted Loi and he is agreeable to be listed as pt's emergency contact and he will transport back to campus when d/c. Pt's phone number is [phone]. CM added to chart. Pt is independent with his care and denies any DME/HH or needs at this time. Pt does not have PCP. He is enrolled with meds to beds for d/c prescriptions. CM will follow.    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              Demographic Summary       Row Name 01/05/24 0905       General Information    Admission Type observation    Arrived From emergency department    Referral Source admission list;case finding    Reason for Consult discharge planning       Contact Information    Permission Granted to Share Info With ;other (see comments)  Pt's  Loi Palacios RN

## 2024-01-05 NOTE — PLAN OF CARE
Goal Outcome Evaluation:              Outcome Evaluation: Pt is resting in bed at this time. Pt has ambulated in room independently throughout the shift. Pt's  contacted and updated per pt request. No acute changes noted at this time. Continuing plan of care.

## 2024-01-05 NOTE — PROGRESS NOTES
[facility] HOSPITALIST PROGRESS NOTE    Subjective     History:   Jael Murphy is a 18 y.o. male admitted on 1/4/2024 secondary to SIRS (systemic inflammatory response syndrome)     Procedures: None    CC: Follow up sepsis    Patient seen and examined with ISRAEL Maynard. Awake and alert. Mild HA noted but feels overall improved. Fever and chills improved. No further episodes of nausea, vomiting or abdominal pain. No reported dysuria or diarrhea. No acute events overnight per RN.     History taken from: patient, chart, and RN.      Objective     Vital Signs  Temp:  [97.6 °F (36.4 °C)-98.5 °F (36.9 °C)] 97.7 °F (36.5 °C)  Heart Rate:  [51-81] 51  Resp:  [16] 16  BP: (114-131)/(61-85) 116/63    Intake/Output Summary (Last 24 hours) at 1/5/2024 1721  Last data filed at 1/5/2024 1700  Gross per 24 hour   Intake 2958.31 ml   Output 3150 ml   Net -191.69 ml         Physical Exam:  General:    Awake, alert, in no acute distress   Heart:      Normal S1 and S2. Regular rate and rhythm. No significant murmur, rubs or gallops appreciated.   Lungs:     Respirations regular, even and unlabored. Lungs clear to auscultation B/L. No wheezes, rales or rhonchi.   Abdomen:   Soft and nontender. No guarding, rebound tenderness or  organomegaly noted. Bowel sounds present x 4.   Extremities:  No clubbing, cyanosis or edema noted. Moves UE and LE equally B/L.     Results Review:    Results from last 7 days   Lab Units 01/05/24  0338 01/04/24  1741 01/04/24  1003   WBC 10*3/mm3 3.71 5.3 5.75   HEMOGLOBIN g/dL 11.8* 13.1 13.8   PLATELETS 10*3/mm3 116* 126* 124*     Results from last 7 days   Lab Units 01/05/24  0338 01/04/24  1003   SODIUM mmol/L 134* 129*   POTASSIUM mmol/L 3.2* 3.9   CHLORIDE mmol/L 102 97*   CO2 mmol/L 25.0 24.7   BUN mg/dL 12 16   CREATININE mg/dL 0.98 1.36*   CALCIUM mg/dL 8.4* 9.1   GLUCOSE mg/dL 136* 118*     Results from last 7 days   Lab Units 01/05/24  0338 01/04/24  1003   BILIRUBIN mg/dL 1.1 1.8*    ALK PHOS U/L 84 103   AST (SGOT) U/L 15 23   ALT (SGPT) U/L 17 25     Results from last 7 days   Lab Units 01/05/24  0338   MAGNESIUM mg/dL 2.0     Results from last 7 days   Lab Units 01/05/24  0338 01/04/24  1920   INR  1.12* 1.13*     Results from last 7 days   Lab Units 01/04/24  1003   CK TOTAL U/L 112       Imaging Results (Last 24 Hours)       ** No results found for the last 24 hours. **              Medications:  atovaquone-proguanil, 4 tablet, Oral, Q24H  sodium chloride, 10 mL, Intravenous, Q12H               Assessment & Plan   Sepsis (present on admission): Likely 2/2 suspected malaria based on initial interpretation of peripheral smear. CDC contacted with concern that patient is from Chloroquine resistant area. Malarone initiated in the ED and continued on admission. Additional studies ordered per CDC and ID recs with malaria smear pending. Renal function and liver enzymes improved. No evidence of acidosis. Stop IVF's. Cont supportive treatment. ID input appreciated.      Acute renal insufficiency: Unclear of baseline with no previous labs available. CT abd/pelvis revealed marked distention of the urinary bladder extending into the lower abdomen with probable associated retention uropathy noted with fullness of the bilateral renal collecting system. CPK normal. Mild urinary retention noted on bladder scans. Cr improved today. Stop IVF's. Monitor I/O's. Repeat labs in the AM.      Mild thrombocytopenia: Likely 2/2 above. Slightly decreased today. Treatment as outlined above. Repeat CBC in the AM.      Hyperbilirubinemia: Likely 2/2 above. US of the liver obtained with no evidence of intrahepatic or extrahepatic biliary dilatation identified with unremarkable GB and liver echotexture. Viral hepatitis panel and HIV non-reactive. Improved today. Repeat CMP in the AM. Repeat PT/INR in the AM.     Mild hypokalemia: Mg is 2. Replace and repeat labs in the AM.     DVT PPX: SCD's     Disposition Possibly home  tomorrow.     Jarvis Mattson,   01/05/24  17:21 EST

## 2024-01-06 LAB
ALBUMIN SERPL-MCNC: 3.3 G/DL (ref 3.5–5.2)
ALBUMIN/GLOB SERPL: 0.9 G/DL
ALP SERPL-CCNC: 91 U/L (ref 39–117)
ALT SERPL W P-5'-P-CCNC: 16 U/L (ref 1–41)
ANION GAP SERPL CALCULATED.3IONS-SCNC: 8.1 MMOL/L (ref 5–15)
APTT PPP: 29.2 SECONDS (ref 26.5–34.5)
AST SERPL-CCNC: 16 U/L (ref 1–40)
BACTERIA SPEC AEROBE CULT: NORMAL
BASOPHILS # BLD AUTO: 0.02 10*3/MM3 (ref 0–0.2)
BASOPHILS NFR BLD AUTO: 0.4 % (ref 0–1.5)
BILIRUB SERPL-MCNC: 1.4 MG/DL (ref 0–1.2)
BUN SERPL-MCNC: 10 MG/DL (ref 6–20)
BUN/CREAT SERPL: 9.6 (ref 7–25)
CALCIUM SPEC-SCNC: 9 MG/DL (ref 8.6–10.5)
CHLORIDE SERPL-SCNC: 102 MMOL/L (ref 98–107)
CO2 SERPL-SCNC: 22.9 MMOL/L (ref 22–29)
CREAT SERPL-MCNC: 1.04 MG/DL (ref 0.76–1.27)
DEPRECATED RDW RBC AUTO: 39 FL (ref 37–54)
EGFRCR SERPLBLD CKD-EPI 2021: 105.4 ML/MIN/1.73
EOSINOPHIL # BLD AUTO: 0.01 10*3/MM3 (ref 0–0.4)
EOSINOPHIL NFR BLD AUTO: 0.2 % (ref 0.3–6.2)
ERYTHROCYTE [DISTWIDTH] IN BLOOD BY AUTOMATED COUNT: 13.1 % (ref 12.3–15.4)
GLOBULIN UR ELPH-MCNC: 3.6 GM/DL
GLUCOSE SERPL-MCNC: 93 MG/DL (ref 65–99)
HCT VFR BLD AUTO: 38.1 % (ref 37.5–51)
HGB BLD-MCNC: 12.8 G/DL (ref 13–17.7)
HIV GENOSURE PRIME(SM): NORMAL
HIV1 RNA # SERPL NAA+PROBE: <20 COPIES/ML
HIV1 RNA SERPL NAA+PROBE-LOG#: NORMAL LOG10COPY/ML
IMM GRANULOCYTES # BLD AUTO: 0.06 10*3/MM3 (ref 0–0.05)
IMM GRANULOCYTES NFR BLD AUTO: 1.2 % (ref 0–0.5)
INR PPP: 1.03 (ref 0.9–1.1)
LYMPHOCYTES # BLD AUTO: 0.71 10*3/MM3 (ref 0.7–3.1)
LYMPHOCYTES NFR BLD AUTO: 13.9 % (ref 19.6–45.3)
MAGNESIUM SERPL-MCNC: 1.8 MG/DL (ref 1.7–2.2)
MCH RBC QN AUTO: 27.5 PG (ref 26.6–33)
MCHC RBC AUTO-ENTMCNC: 33.6 G/DL (ref 31.5–35.7)
MCV RBC AUTO: 81.8 FL (ref 79–97)
MONOCYTES # BLD AUTO: 0.87 10*3/MM3 (ref 0.1–0.9)
MONOCYTES NFR BLD AUTO: 17 % (ref 5–12)
NEUTROPHILS NFR BLD AUTO: 3.45 10*3/MM3 (ref 1.7–7)
NEUTROPHILS NFR BLD AUTO: 67.3 % (ref 42.7–76)
NRBC BLD AUTO-RTO: 0 /100 WBC (ref 0–0.2)
PLATELET # BLD AUTO: 119 10*3/MM3 (ref 140–450)
PMV BLD AUTO: 10.4 FL (ref 6–12)
POTASSIUM SERPL-SCNC: 4.4 MMOL/L (ref 3.5–5.2)
PROT SERPL-MCNC: 6.9 G/DL (ref 6–8.5)
PROTHROMBIN TIME: 14 SECONDS (ref 12.1–14.7)
RBC # BLD AUTO: 4.66 10*6/MM3 (ref 4.14–5.8)
SODIUM SERPL-SCNC: 133 MMOL/L (ref 136–145)
WBC NRBC COR # BLD AUTO: 5.12 10*3/MM3 (ref 3.4–10.8)

## 2024-01-06 PROCEDURE — 25010000002 MAGNESIUM SULFATE 2 GM/50ML SOLUTION: Performed by: INTERNAL MEDICINE

## 2024-01-06 PROCEDURE — G0378 HOSPITAL OBSERVATION PER HR: HCPCS

## 2024-01-06 PROCEDURE — 96366 THER/PROPH/DIAG IV INF ADDON: CPT

## 2024-01-06 PROCEDURE — 96365 THER/PROPH/DIAG IV INF INIT: CPT

## 2024-01-06 PROCEDURE — 80053 COMPREHEN METABOLIC PANEL: CPT | Performed by: INTERNAL MEDICINE

## 2024-01-06 PROCEDURE — 85025 COMPLETE CBC W/AUTO DIFF WBC: CPT | Performed by: INTERNAL MEDICINE

## 2024-01-06 PROCEDURE — 85610 PROTHROMBIN TIME: CPT | Performed by: INTERNAL MEDICINE

## 2024-01-06 PROCEDURE — 99214 OFFICE O/P EST MOD 30 MIN: CPT | Performed by: NURSE PRACTITIONER

## 2024-01-06 PROCEDURE — 99232 SBSQ HOSP IP/OBS MODERATE 35: CPT | Performed by: INTERNAL MEDICINE

## 2024-01-06 PROCEDURE — 51798 US URINE CAPACITY MEASURE: CPT

## 2024-01-06 PROCEDURE — 85730 THROMBOPLASTIN TIME PARTIAL: CPT | Performed by: INTERNAL MEDICINE

## 2024-01-06 PROCEDURE — 83735 ASSAY OF MAGNESIUM: CPT | Performed by: INTERNAL MEDICINE

## 2024-01-06 PROCEDURE — 63710000001 ONDANSETRON ODT 4 MG TABLET DISPERSIBLE

## 2024-01-06 RX ORDER — MAGNESIUM SULFATE HEPTAHYDRATE 40 MG/ML
2 INJECTION, SOLUTION INTRAVENOUS ONCE
Status: COMPLETED | OUTPATIENT
Start: 2024-01-06 | End: 2024-01-06

## 2024-01-06 RX ORDER — LACTULOSE 10 G/15ML
30 SOLUTION ORAL ONCE
Status: COMPLETED | OUTPATIENT
Start: 2024-01-06 | End: 2024-01-06

## 2024-01-06 RX ADMIN — ONDANSETRON 4 MG: 4 TABLET, ORALLY DISINTEGRATING ORAL at 00:41

## 2024-01-06 RX ADMIN — ATOVAQUONE AND PROGUANIL HYDROCHLORIDE 4 TABLET: 250; 100 TABLET, FILM COATED ORAL at 20:43

## 2024-01-06 RX ADMIN — LACTULOSE 30 G: 20 SOLUTION ORAL at 09:42

## 2024-01-06 RX ADMIN — DOCUSATE SODIUM 50 MG AND SENNOSIDES 8.6 MG 2 TABLET: 8.6; 5 TABLET, FILM COATED ORAL at 09:42

## 2024-01-06 RX ADMIN — DOCUSATE SODIUM 50 MG AND SENNOSIDES 8.6 MG 2 TABLET: 8.6; 5 TABLET, FILM COATED ORAL at 00:41

## 2024-01-06 RX ADMIN — DOCUSATE SODIUM 50 MG AND SENNOSIDES 8.6 MG 2 TABLET: 8.6; 5 TABLET, FILM COATED ORAL at 20:43

## 2024-01-06 RX ADMIN — Medication 10 ML: at 20:44

## 2024-01-06 RX ADMIN — Medication 10 ML: at 09:42

## 2024-01-06 RX ADMIN — MAGNESIUM SULFATE HEPTAHYDRATE 2 G: 40 INJECTION, SOLUTION INTRAVENOUS at 15:21

## 2024-01-06 NOTE — PROGRESS NOTES
PROGRESS NOTE         Patient Identification:  Name:  Jael Murphy  Age:  18 y.o.  Sex:  male  :  2005  MRN:  [id number]  Visit Number:  [id number]  Primary Care Provider:  Provider, No Known         LOS: 0 days       ----------------------------------------------------------------------------------------------------------------------  Subjective       Chief Complaints:    Vomiting and Cough        Interval History:      Patient resting comfortably in bed this morning.  Currently on room air with no apparent distress.  Reports mild headache and nausea early this morning but has since resolved.  Denies vomiting or diarrhea.  Denies abdominal pain.  Lungs clear to auscultation bilaterally.  Abdomen soft, nontender.  WBC normal at 5.12.  Hematology slide review from 2024 pathologist interpretation reports occasional subtle ring forms present and red cells highly suggestive of malaria species.    Review of Systems:    Constitutional: no fever, chills and night sweats.   Eyes: no eye drainage, itching or redness.  HEENT: no mouth sores, dysphagia or nose bleed.  Respiratory: no for shortness of breath, cough or production of sputum.  Cardiovascular: no chest pain, no palpitations, no orthopnea.  Gastrointestinal: no nausea, vomiting or diarrhea. No abdominal pain, hematemesis or rectal bleeding.  Genitourinary: no dysuria or polyuria.  Hematologic/lymphatic: no lymph node abnormalities, no easy bruising or easy bleeding.  Musculoskeletal: no muscle or joint pain.  Skin: No rash and no itching.  Neurological: no loss of consciousness, no seizure, no headache.  Behavioral/Psych: no depression or suicidal ideation.  Endocrine: no hot flashes.  Immunologic: negative.    ----------------------------------------------------------------------------------------------------------------------      Objective       Current [facility] Meds:  atovaquone-proguanil, 4 tablet, Oral, Q24H  senna-docusate sodium, 2  tablet, Oral, BID  sodium chloride, 10 mL, Intravenous, Q12H         ----------------------------------------------------------------------------------------------------------------------    Vital Signs:  Temp:  [97.4 °F (36.3 °C)-99.2 °F (37.3 °C)] 99.2 °F (37.3 °C)  Heart Rate:  [51-98] 90  Resp:  [16] 16  BP: (107-131)/(62-89) 131/89  No data found.  SpO2 Percentage    01/04/24 2053 01/05/24 1017 01/06/24 0300   SpO2: 98% 98% 98%     SpO2:  [98 %] 98 %  on   ;   Device (Oxygen Therapy): room air    Body mass index is 25.57 kg/m².  Wt Readings from Last 3 Encounters:   01/04/24 87.9 kg (193 lb 12.8 oz)        Intake/Output Summary (Last 24 hours) at 1/6/2024 1253  Last data filed at 1/6/2024 0600  Gross per 24 hour   Intake 1528.31 ml   Output --   Net 1528.31 ml     Diet: Regular/House Diet; Texture: Regular Texture (IDDSI 7); Fluid Consistency: Thin (IDDSI 0)  ----------------------------------------------------------------------------------------------------------------------      Physical Exam:    Constitutional:  Well-developed and well-nourished.  No respiratory distress.  On room air.      HENT:  Head: Normocephalic and atraumatic.  Mouth:  Moist mucous membranes.    Eyes:  Conjunctivae and EOM are normal.  No scleral icterus.  Neck:  Neck supple.  No JVD present.    Cardiovascular:  Normal rate, regular rhythm and normal heart sounds with no murmur. No edema.  Pulmonary/Chest:  No respiratory distress, no wheezes, no crackles, with normal breath sounds and good air movement.  Abdominal:  Soft.  Bowel sounds are normal.  No distension and no tenderness.   Musculoskeletal:  No edema, no tenderness, and no deformity.  No swelling or redness of joints.  Neurological:  Alert and oriented to person, place, and time.  No facial droop.  No slurred speech.   Skin:  Skin is warm and dry.  No rash noted.  No pallor.   Psychiatric:  Normal mood and affect.  Behavior is  "normal.        ----------------------------------------------------------------------------------------------------------------------  Results from last 7 days   Lab Units 01/04/24  1003   CK TOTAL U/L 112           Results from last 7 days   Lab Units 01/06/24  0126 01/05/24  0338 01/04/24  1920 01/04/24  1741 01/04/24  1003   LACTATE mmol/L  --   --   --   --  1.1   WBC 10*3/mm3 5.12 3.71  --  5.3 5.75   HEMOGLOBIN g/dL 12.8* 11.8*  --  13.1 13.8   HEMATOCRIT % 38.1 35.3*  --  37.9 39.9   MCV fL 81.8 82.7  --  81 80.3   MCHC g/dL 33.6 33.4  --  34.6 34.6   PLATELETS 10*3/mm3 119* 116*  --  126* 124*   INR  1.03 1.12* 1.13*  --   --      Results from last 7 days   Lab Units 01/06/24  0126 01/05/24  1826 01/05/24 0338 01/04/24  1003   SODIUM mmol/L 133*  --  134* 129*   POTASSIUM mmol/L 4.4 3.9 3.2* 3.9   MAGNESIUM mg/dL 1.8  --  2.0  --    CHLORIDE mmol/L 102  --  102 97*   CO2 mmol/L 22.9  --  25.0 24.7   BUN mg/dL 10  --  12 16   CREATININE mg/dL 1.04  --  0.98 1.36*   CALCIUM mg/dL 9.0  --  8.4* 9.1   GLUCOSE mg/dL 93  --  136* 118*   ALBUMIN g/dL 3.3*  --  3.1* 3.5   BILIRUBIN mg/dL 1.4*  --  1.1 1.8*   ALK PHOS U/L 91  --  84 103   AST (SGOT) U/L 16  --  15 23   ALT (SGPT) U/L 16  --  17 25   Estimated Creatinine Clearance: 140.9 mL/min (by C-G formula based on SCr of 1.04 mg/dL).  No results found for: \"AMMONIA\"    No results found for: \"HGBA1C\", \"POCGLU\"  No results found for: \"HGBA1C\"  No results found for: \"TSH\", \"FREET4\"    Blood Culture   Date Value Ref Range Status   01/04/2024 No growth at 2 days  Preliminary   01/04/2024 No growth at 2 days  Preliminary     No results found for: \"URINECX\"  No results found for: \"WOUNDCX\"  No results found for: \"STOOLCX\"  No results found for: \"RESPCX\"  Pain Management Panel           No data to display                  ----------------------------------------------------------------------------------------------------------------------  Imaging Results (Last 24 " Hours)       ** No results found for the last 24 hours. **            ----------------------------------------------------------------------------------------------------------------------    Pertinent Infectious Disease Results                Assessment/Plan       Assessment       Malaria       Plan      Patient resting comfortably in bed this morning.  Currently on room air with no apparent distress.  Reports mild headache and nausea early this morning but has since resolved.  Denies vomiting or diarrhea.  Denies abdominal pain.  Lungs clear to auscultation bilaterally.  Abdomen soft, nontender.  WBC normal at 5.12.  Hematology slide review from 1/4/2024 pathologist interpretation reports occasional subtle ring forms present and red cells highly suggestive of malaria species.    Recommend to continue 3-day course of atovaquone-proguanil malaria treatment.  Patient is overall stable from infectious disease standpoint.  Okay to discharge from ID standpoint.  No isolation is required based on infectious disease standpoint.     ANTIMICROBIAL THERAPY    This patient does not have an active medication from one of the medication groupers.     Code Status:   Code Status and Medical Interventions:   Ordered at: 01/04/24 1706     Code Status (Patient has no pulse and is not breathing):    CPR (Attempt to Resuscitate)     Medical Interventions (Patient has pulse or is breathing):    Full Support       Colleen Dudley, DIA  01/06/24  12:53 EST

## 2024-01-06 NOTE — PLAN OF CARE
Goal Outcome Evaluation:  Plan of Care Reviewed With: patient        Progress: improving  Outcome Evaluation: Patient resting in bed at this time. VSS. Patient has ambulated in room independently. Patient had c/o nausea this shift. PA aware. See MAR. Patient also had c/o of having hard time having a bowel movement. Medication given per MAR. Patient has no requests or concerns at this time. Will continue with plan of care.

## 2024-01-06 NOTE — PROGRESS NOTES
[facility] HOSPITALIST PROGRESS NOTE    Subjective     History:   Jael Murphy is a 18 y.o. male admitted on 1/4/2024 secondary to SIRS (systemic inflammatory response syndrome)     Procedures: None    CC: Follow up sepsis    Patient seen and examined with ISRAEL Youngblood. Awake and alert. Reports nausea and difficulty having a BM. No reported CP, dyspnea or palpitations. No acute events overnight per RN.     History taken from: patient, chart, and RN.      Objective     Vital Signs  Temp:  [97.4 °F (36.3 °C)-99.2 °F (37.3 °C)] 99.2 °F (37.3 °C)  Heart Rate:  [51-98] 90  Resp:  [16] 16  BP: (107-131)/(62-89) 131/89    Intake/Output Summary (Last 24 hours) at 1/6/2024 1405  Last data filed at 1/6/2024 0600  Gross per 24 hour   Intake 1528.31 ml   Output --   Net 1528.31 ml         Physical Exam: Unchanged from previous.   General:    Awake, alert, in no acute distress   Heart:      Normal S1 and S2. Regular rate and rhythm. No significant murmur, rubs or gallops appreciated.   Lungs:     Respirations regular, even and unlabored. Lungs clear to auscultation B/L. No wheezes, rales or rhonchi.   Abdomen:   Soft and nontender. No guarding, rebound tenderness or  organomegaly noted. Bowel sounds present x 4.   Extremities:  No clubbing, cyanosis or edema noted. Moves UE and LE equally B/L.     Results Review:    Results from last 7 days   Lab Units 01/06/24  0126 01/05/24  0338 01/04/24  1741 01/04/24  1003   WBC 10*3/mm3 5.12 3.71 5.3 5.75   HEMOGLOBIN g/dL 12.8* 11.8* 13.1 13.8   PLATELETS 10*3/mm3 119* 116* 126* 124*     Results from last 7 days   Lab Units 01/06/24  0126 01/05/24  1826 01/05/24  0338 01/04/24  1003   SODIUM mmol/L 133*  --  134* 129*   POTASSIUM mmol/L 4.4 3.9 3.2* 3.9   CHLORIDE mmol/L 102  --  102 97*   CO2 mmol/L 22.9  --  25.0 24.7   BUN mg/dL 10  --  12 16   CREATININE mg/dL 1.04  --  0.98 1.36*   CALCIUM mg/dL 9.0  --  8.4* 9.1   GLUCOSE mg/dL 93  --  136* 118*     Results from  last 7 days   Lab Units 01/06/24  0126 01/05/24  0338 01/04/24  1003   BILIRUBIN mg/dL 1.4* 1.1 1.8*   ALK PHOS U/L 91 84 103   AST (SGOT) U/L 16 15 23   ALT (SGPT) U/L 16 17 25     Results from last 7 days   Lab Units 01/06/24  0126 01/05/24  0338   MAGNESIUM mg/dL 1.8 2.0     Results from last 7 days   Lab Units 01/06/24  0126 01/05/24  0338 01/04/24  1920   INR  1.03 1.12* 1.13*     Results from last 7 days   Lab Units 01/04/24  1003   CK TOTAL U/L 112       Imaging Results (Last 24 Hours)       ** No results found for the last 24 hours. **              Medications:  atovaquone-proguanil, 4 tablet, Oral, Q24H  senna-docusate sodium, 2 tablet, Oral, BID  sodium chloride, 10 mL, Intravenous, Q12H               Assessment & Plan   Sepsis (present on admission): Likely 2/2 suspected malaria. CDC contacted with concern that patient is from Chloroquine-resistant area. Malarone initiated in the ED and continued on admission. Additional studies ordered per CDC and ID recs with malaria smear pending. Renal function and liver enzymes improved. No evidence of acidosis. Cont supportive treatment. ID input appreciated.      Acute renal insufficiency: Unclear of baseline with no previous labs available. CT abd/pelvis revealed marked distention of the urinary bladder extending into the lower abdomen with probable associated retention uropathy noted with fullness of the bilateral renal collecting system. CPK normal. Mild urinary retention noted on bladder scans but improving. Cr improved  and stable today after stopping IVF's. Monitor I/O's. Repeat labs in the AM.      Mild thrombocytopenia: Likely 2/2 above. Stable today. Treatment as outlined above. Repeat CBC in the AM.      Hyperbilirubinemia: Likely 2/2 above. US of the liver obtained with no evidence of intrahepatic or extrahepatic biliary dilatation identified with unremarkable GB and liver echotexture. Viral hepatitis panel and HIV non-reactive. Stable today. Repeat CMP  in the AM. Repeat PT/INR improved.     Mild hypokalemia: K+ improved with supplementation. Mg is <2 and replacement ordered. Repeat labs in the AM.     Constipation: Improved after dose of lactulose.     DVT PPX: SCD's     Disposition Discussed possible D/C today. Pt hesitant to go home 2/2  nausea and difficulty with BM's. Possibly home tomorrow.     Jarvis Mattson,   01/06/24  14:05 EST

## 2024-01-06 NOTE — PLAN OF CARE
Goal Outcome Evaluation:  Pt has rested well this shift. Pt had complaints of constipation,  Notified, see MAR for Dr. Orders, ordered medication was a success. VSS on room air at this time. No acute changes noted at this time. Will continue to follow plan of care.

## 2024-01-07 VITALS
OXYGEN SATURATION: 95 % | HEART RATE: 65 BPM | SYSTOLIC BLOOD PRESSURE: 136 MMHG | BODY MASS INDEX: 25.69 KG/M2 | TEMPERATURE: 98.1 F | HEIGHT: 73 IN | WEIGHT: 193.8 LBS | DIASTOLIC BLOOD PRESSURE: 78 MMHG | RESPIRATION RATE: 18 BRPM

## 2024-01-07 LAB
ALBUMIN SERPL-MCNC: 3.5 G/DL (ref 3.5–5.2)
ALBUMIN/GLOB SERPL: 0.9 G/DL
ALP SERPL-CCNC: 98 U/L (ref 39–117)
ALT SERPL W P-5'-P-CCNC: 17 U/L (ref 1–41)
ANION GAP SERPL CALCULATED.3IONS-SCNC: 8 MMOL/L (ref 5–15)
AST SERPL-CCNC: 19 U/L (ref 1–40)
BILIRUB SERPL-MCNC: 1.2 MG/DL (ref 0–1.2)
BUN SERPL-MCNC: 9 MG/DL (ref 6–20)
BUN/CREAT SERPL: 7.4 (ref 7–25)
BURR CELLS BLD QL SMEAR: ABNORMAL
CALCIUM SPEC-SCNC: 9.2 MG/DL (ref 8.6–10.5)
CHLORIDE SERPL-SCNC: 100 MMOL/L (ref 98–107)
CO2 SERPL-SCNC: 25 MMOL/L (ref 22–29)
CREAT SERPL-MCNC: 1.22 MG/DL (ref 0.76–1.27)
DEPRECATED RDW RBC AUTO: 39 FL (ref 37–54)
EGFRCR SERPLBLD CKD-EPI 2021: 87 ML/MIN/1.73
EOSINOPHIL # BLD MANUAL: 0.1 10*3/MM3 (ref 0–0.4)
EOSINOPHIL NFR BLD MANUAL: 2 % (ref 0.3–6.2)
ERYTHROCYTE [DISTWIDTH] IN BLOOD BY AUTOMATED COUNT: 13.2 % (ref 12.3–15.4)
GLOBULIN UR ELPH-MCNC: 4 GM/DL
GLUCOSE SERPL-MCNC: 94 MG/DL (ref 65–99)
HCT VFR BLD AUTO: 38 % (ref 37.5–51)
HGB BLD-MCNC: 12.9 G/DL (ref 13–17.7)
LARGE PLATELETS: ABNORMAL
LYMPHOCYTES # BLD MANUAL: 2.02 10*3/MM3 (ref 0.7–3.1)
LYMPHOCYTES NFR BLD MANUAL: 24 % (ref 5–12)
MAGNESIUM SERPL-MCNC: 2 MG/DL (ref 1.7–2.2)
MCH RBC QN AUTO: 27.8 PG (ref 26.6–33)
MCHC RBC AUTO-ENTMCNC: 33.9 G/DL (ref 31.5–35.7)
MCV RBC AUTO: 81.9 FL (ref 79–97)
MONOCYTES # BLD: 1.18 10*3/MM3 (ref 0.1–0.9)
NEUTROPHILS # BLD AUTO: 1.63 10*3/MM3 (ref 1.7–7)
NEUTROPHILS NFR BLD MANUAL: 30 % (ref 42.7–76)
NEUTS BAND NFR BLD MANUAL: 3 % (ref 0–5)
PLATELET # BLD AUTO: 144 10*3/MM3 (ref 140–450)
PMV BLD AUTO: 11.3 FL (ref 6–12)
POTASSIUM SERPL-SCNC: 4 MMOL/L (ref 3.5–5.2)
PROT SERPL-MCNC: 7.5 G/DL (ref 6–8.5)
QT INTERVAL: 346 MS
QTC INTERVAL: 408 MS
RBC # BLD AUTO: 4.64 10*6/MM3 (ref 4.14–5.8)
SCAN SLIDE: NORMAL
SODIUM SERPL-SCNC: 133 MMOL/L (ref 136–145)
VARIANT LYMPHS NFR BLD MANUAL: 41 % (ref 19.6–45.3)
WBC NRBC COR # BLD AUTO: 4.93 10*3/MM3 (ref 3.4–10.8)

## 2024-01-07 PROCEDURE — G0378 HOSPITAL OBSERVATION PER HR: HCPCS

## 2024-01-07 PROCEDURE — 83735 ASSAY OF MAGNESIUM: CPT | Performed by: INTERNAL MEDICINE

## 2024-01-07 PROCEDURE — 99239 HOSP IP/OBS DSCHRG MGMT >30: CPT | Performed by: INTERNAL MEDICINE

## 2024-01-07 PROCEDURE — 80053 COMPREHEN METABOLIC PANEL: CPT | Performed by: INTERNAL MEDICINE

## 2024-01-07 PROCEDURE — 85007 BL SMEAR W/DIFF WBC COUNT: CPT | Performed by: INTERNAL MEDICINE

## 2024-01-07 PROCEDURE — 85025 COMPLETE CBC W/AUTO DIFF WBC: CPT | Performed by: INTERNAL MEDICINE

## 2024-01-07 RX ADMIN — Medication 10 ML: at 08:22

## 2024-01-07 NOTE — DISCHARGE SUMMARY
[facility] DISCHARGE SUMMARY      Date of Admission: 1/4/2024    Date of Discharge:  1/7/2024    PCP: Provider, No Known    Admission Diagnosis:   Please see admission H&P    Discharge Diagnosis:   Sepsis  Malaria  Acute renal insufficiency  Mild thrombocytopenia  Hyperbilirubinemia  Mild hypokalemia  Mild hypomagnesemia   Urinary retention  Constipation     Procedures Performed:  None     Consults:   Consults       Date and Time Order Name Status Description    1/4/2024  8:19 PM Inpatient Infectious Diseases Consult Completed               History of Present Illness:  Jael Murphy is a 18 y.o. [place] male without any known significant past medical history who presented to the ED at [facility] via private vehicle with CC of headache, nausea, vomiting and abdominal discomfort.  Please see admission H&P for complete details.     In the ED,  he was febrile on presentation with temp of 103. He was mildly tachycardic but hemodynamically stable. Routine labs revealed mild hyponatremia, mildly elevated creatine, elevated total bilirubin, normal lactate, normal WBC and mild thrombocytopenia. Viral hepatitis panel non-reactive. Monospot negative. Rapid strep screen negative. Respiratory PCR negative. UA negative. CXR unremarkable. CT abd/pelvis revealed mild diffuse fatty infiltration of the liver and marked distention of the urinary bladder extending into the lower abdomen with probable associated retention uropathy noted with fullness of the bilateral renal collecting system. US of the liver obtained with no evidence of intrahepatic or extrahepatic biliary dilatation identified with unremarkable GB and liver echotexture. He was able to void with some urinary retention noted. Blood cultures were obtained. ED provider contacted ID physician with additional workup ordered. A peripheral smear was obtained with pathology noting concern for findings consistent with malaria. CDC representative was contacted by ED  provider with noted concern that patient is from an area with possible Chloroquine resistance with recs for Coartem or Malarone. Pharmacy was contacted and able to secure Malarone which was initiated.      Hospital Course  Jael Murphy was admitted to the med/surg floor for further evaluation and treatment. He was continued on course of Malarone which was initiated in the ED. ID was consulted for further input. He was placed in a private room but did not require isolation per IC protocols.     Serial bladder scans were ordered with improvement in his urinary retention over the course of his hospitalization. He later reported constipation which improved with a PO bowel regimen with subsequent improvement in his urinary retention. His renal function quickly improved and maintenance IVF's were stopped as his PO intake improved. His liver enzymes improved on follow up labs.Viral hepatitis panel and HIV were non-reactive. Routine labs revealed mild electrolyte abnormalities including hypokalemia and hypomagnesemia, both of which were replaced per hospital protocol.     Confirmatory testing remained pending at the time of discharge but his clinical presentation and initial peripheral smear were consistent with malaria. He quickly clinically improved with resolution of his fever and improving labs. He would complete a course of Malarone during his hospitalization. He was evaluated with ISRAEL Maynard on 1/7/24 and deemed medically stable for discharge. Instructions were given for close outpatient follow up with a local PCP or with provider in employee health at .      Condition on Discharge:  Stable    Vital Signs  Vitals:    01/07/24 0600   BP: 136/78   Pulse: 65   Resp: 18   Temp: 98.1 °F (36.7 °C)   SpO2: 95%       Physical Exam:  General:    Awake, alert, in no acute distress   Heart:      Normal S1 and S2. Regular rate and rhythm. No significant murmur, rubs or gallops appreciated.   Lungs:     Respirations regular,  even and unlabored. Lungs clear to auscultation B/L. No wheezes, rales or rhonchi.   Abdomen:   Soft and nontender. No guarding, rebound tenderness or  organomegaly noted. Bowel sounds present x 4.   Extremities:  No clubbing, cyanosis or edema noted. Moves UE and LE equally B/L.     Discharge Disposition:   home      Discharge Medications:     Discharge Medications        Stop These Medications      ibuprofen 400 MG tablet  Commonly known as: ADVIL,MOTRIN                Discharge Diet:   Dietary Orders (From admission, onward)       Start     Ordered    01/04/24 2020  Diet: Regular/House Diet; Texture: Regular Texture (IDDSI 7); Fluid Consistency: Thin (IDDSI 0)  Diet Effective Now        References:    Diet Order [place]   Question Answer Comment   Diets: Regular/House Diet    Texture: Regular Texture (IDDSI 7)    Fluid Consistency: Thin (IDDSI 0)        01/04/24 2019                    Activity at Discharge:  activity as tolerated    Follow-up Appointments:  Additional Instructions for the Follow-ups that You Need to Schedule       Discharge Follow-up with PCP   As directed       Currently Documented PCP:    Provider, No Known    PCP Phone Number:    [phone]     Follow Up Details: Follow up with PCP (or employee health at ) within 1 week.               Follow-up Information       Provider, No Known .    Why: Follow up with PCP (or employee health at ) within 1 week.  Contact information:  [facility] [address] [phone]                                 Test Results Pending at Discharge:  Pending Labs       Order Current Status    Ehrlichia Profile DNA PCR In process    Malaria Smear In process    PERIPHERAL SMEAR, [facility] In process    Rickettsia Species DNA, Real-Time PCR In process    Blood Culture - Blood, Arm, Left Preliminary result    Blood Culture - Blood, Arm, Right Preliminary result             The ASCVD Risk score (Farhat LEUNG, et al., 2019) failed to calculate for the  following reasons:    The 2019 ASCVD risk score is only valid for ages 40 to 79      Jarvis Mattson DO  01/07/24  08:37 EST      Time: Greater than 30 minutes spent on this discharge.

## 2024-01-07 NOTE — NURSING NOTE
Pt is being discharged at this time. Pt's , Loi Canela, updated on patient's discharge and will provide transportation.

## 2024-01-07 NOTE — PLAN OF CARE
Goal Outcome Evaluation:  Plan of Care Reviewed With: patient        Progress: improving  Outcome Evaluation: Patient resting in bed at this time. VSS. Patient has ambulated in room independently. Rehana has no requests or concerns at this time. Will continue with plan of care.

## 2024-01-08 LAB
A PHAGOCYTOPH DNA BLD QL NAA+PROBE: NEGATIVE
BASOPHILS # BLD AUTO: 0 X10E3/UL (ref 0–0.2)
BASOPHILS NFR BLD AUTO: 0 %
CD3+CD4+ CELLS # BLD: 859 /UL (ref 359–1519)
CD3+CD4+ CELLS NFR BLD: 53.7 % (ref 30.8–58.5)
E CHAFFEENSIS DNA BLD QL NAA+PROBE: NEGATIVE
EOSINOPHIL # BLD AUTO: 0 X10E3/UL (ref 0–0.4)
EOSINOPHIL NFR BLD AUTO: 0 %
ERYTHROCYTE [DISTWIDTH] IN BLOOD BY AUTOMATED COUNT: 13 % (ref 11.6–15.4)
HCT VFR BLD AUTO: 37.9 % (ref 37.5–51)
HGB BLD-MCNC: 13.1 G/DL (ref 13–17.7)
IMM GRANULOCYTES # BLD AUTO: 0 X10E3/UL (ref 0–0.1)
IMM GRANULOCYTES NFR BLD AUTO: 1 %
LYMPHOCYTES # BLD AUTO: 1.6 X10E3/UL (ref 0.7–3.1)
LYMPHOCYTES NFR BLD AUTO: 30 %
MCH RBC QN AUTO: 27.9 PG (ref 26.6–33)
MCHC RBC AUTO-ENTMCNC: 34.6 G/DL (ref 31.5–35.7)
MCV RBC AUTO: 81 FL (ref 79–97)
MONOCYTES # BLD AUTO: 1.1 X10E3/UL (ref 0.1–0.9)
MONOCYTES NFR BLD AUTO: 22 %
MORPHOLOGY BLD-IMP: ABNORMAL
NEUTROPHILS # BLD AUTO: 2.5 X10E3/UL (ref 1.4–7)
NEUTROPHILS NFR BLD AUTO: 47 %
PLATELET # BLD AUTO: 126 X10E3/UL (ref 150–450)
RBC # BLD AUTO: 4.7 X10E6/UL (ref 4.14–5.8)
REF LAB TEST METHOD: NORMAL
RICKETTSIA RICKETTSII DNA, RT: NOT DETECTED
WBC # BLD AUTO: 5.3 X10E3/UL (ref 3.4–10.8)

## 2024-01-09 LAB
BACTERIA SPEC AEROBE CULT: NORMAL
BACTERIA SPEC AEROBE CULT: NORMAL

## 2024-01-17 LAB
PARASITE BLD: POSITIVE
REF LAB TEST METHOD: ABNORMAL

## 2024-01-29 ENCOUNTER — HOSPITAL ENCOUNTER (OUTPATIENT)
Dept: GENERAL RADIOLOGY | Facility: HOSPITAL | Age: 19
Discharge: HOME | End: 2024-01-29
Admitting: PHYSICIAN ASSISTANT
Payer: COMMERCIAL

## 2024-01-29 ENCOUNTER — TRANSCRIBE ORDERS (OUTPATIENT)
Dept: ADMINISTRATIVE | Facility: HOSPITAL | Age: 19
End: 2024-01-29
Payer: COMMERCIAL

## 2024-01-29 DIAGNOSIS — M79.604 RIGHT LEG PAIN: Primary | ICD-10-CM

## 2024-01-29 DIAGNOSIS — M79.604 RIGHT LEG PAIN: ICD-10-CM

## 2024-01-29 PROCEDURE — 73590 X-RAY EXAM OF LOWER LEG: CPT

## 2024-01-29 PROCEDURE — 73590 X-RAY EXAM OF LOWER LEG: CPT | Performed by: RADIOLOGY

## 2024-08-16 ENCOUNTER — HOSPITAL ENCOUNTER (OUTPATIENT)
Dept: GENERAL RADIOLOGY | Facility: HOSPITAL | Age: 19
Discharge: HOME | End: 2024-08-16
Admitting: PHYSICIAN ASSISTANT
Payer: COMMERCIAL

## 2024-08-16 ENCOUNTER — TRANSCRIBE ORDERS (OUTPATIENT)
Dept: ADMINISTRATIVE | Facility: HOSPITAL | Age: 19
End: 2024-08-16
Payer: COMMERCIAL

## 2024-08-16 DIAGNOSIS — M79.604 RIGHT LEG PAIN: Primary | ICD-10-CM

## 2024-08-16 DIAGNOSIS — M79.604 RIGHT LEG PAIN: ICD-10-CM

## 2024-08-16 PROCEDURE — 73590 X-RAY EXAM OF LOWER LEG: CPT
